# Patient Record
Sex: MALE | Race: WHITE
[De-identification: names, ages, dates, MRNs, and addresses within clinical notes are randomized per-mention and may not be internally consistent; named-entity substitution may affect disease eponyms.]

---

## 2020-02-25 ENCOUNTER — APPOINTMENT (OUTPATIENT)
Dept: FAMILY MEDICINE | Facility: CLINIC | Age: 30
End: 2020-02-25
Payer: COMMERCIAL

## 2020-02-25 VITALS
SYSTOLIC BLOOD PRESSURE: 122 MMHG | RESPIRATION RATE: 13 BRPM | OXYGEN SATURATION: 98 % | HEIGHT: 70 IN | DIASTOLIC BLOOD PRESSURE: 74 MMHG | WEIGHT: 271 LBS | BODY MASS INDEX: 38.8 KG/M2 | HEART RATE: 77 BPM

## 2020-02-25 DIAGNOSIS — Z29.9 ENCOUNTER FOR PROPHYLACTIC MEASURES, UNSPECIFIED: ICD-10-CM

## 2020-02-25 DIAGNOSIS — Z20.6 CONTACT WITH AND (SUSPECTED) EXPOSURE TO HUMAN IMMUNODEFICIENCY VIRUS [HIV]: ICD-10-CM

## 2020-02-25 DIAGNOSIS — Z00.00 ENCOUNTER FOR GENERAL ADULT MEDICAL EXAMINATION W/OUT ABNORMAL FINDINGS: ICD-10-CM

## 2020-02-25 DIAGNOSIS — Z77.21 CONTACT WITH AND (SUSPECTED) EXPOSURE TO POTENTIALLY HAZARDOUS BODY FLUIDS: ICD-10-CM

## 2020-02-25 DIAGNOSIS — Z02.89 ENCOUNTER FOR OTHER ADMINISTRATIVE EXAMINATIONS: ICD-10-CM

## 2020-02-25 DIAGNOSIS — Z87.09 PERSONAL HISTORY OF OTHER DISEASES OF THE RESPIRATORY SYSTEM: ICD-10-CM

## 2020-02-25 PROCEDURE — 00018S: CUSTOM

## 2020-02-25 PROCEDURE — 00024S: CUSTOM

## 2020-02-25 PROCEDURE — 00012S: CUSTOM

## 2020-02-25 PROCEDURE — 00001S: CUSTOM

## 2020-02-25 PROCEDURE — 00017S: CUSTOM

## 2020-02-25 NOTE — PHYSICAL EXAM
[No Acute Distress] : no acute distress [Well Nourished] : well nourished [Well Developed] : well developed [Well-Appearing] : well-appearing [Normal Sclera/Conjunctiva] : normal sclera/conjunctiva [PERRL] : pupils equal round and reactive to light [Normal Outer Ear/Nose] : the outer ears and nose were normal in appearance [EOMI] : extraocular movements intact [Normal Oropharynx] : the oropharynx was normal [No JVD] : no jugular venous distention [No Lymphadenopathy] : no lymphadenopathy [Supple] : supple [No Respiratory Distress] : no respiratory distress  [Thyroid Normal, No Nodules] : the thyroid was normal and there were no nodules present [Clear to Auscultation] : lungs were clear to auscultation bilaterally [No Accessory Muscle Use] : no accessory muscle use [Normal Rate] : normal rate  [Normal S1, S2] : normal S1 and S2 [Regular Rhythm] : with a regular rhythm [No Murmur] : no murmur heard [No Carotid Bruits] : no carotid bruits [No Varicosities] : no varicosities [No Abdominal Bruit] : a ~M bruit was not heard ~T in the abdomen [Pedal Pulses Present] : the pedal pulses are present [No Edema] : there was no peripheral edema [No Palpable Aorta] : no palpable aorta [Soft] : abdomen soft [No Extremity Clubbing/Cyanosis] : no extremity clubbing/cyanosis [Non Tender] : non-tender [Non-distended] : non-distended [No HSM] : no HSM [No Masses] : no abdominal mass palpated [Normal Bowel Sounds] : normal bowel sounds [Normal Posterior Cervical Nodes] : no posterior cervical lymphadenopathy [Normal Anterior Cervical Nodes] : no anterior cervical lymphadenopathy [No Spinal Tenderness] : no spinal tenderness [No CVA Tenderness] : no CVA  tenderness [No Joint Swelling] : no joint swelling [Grossly Normal Strength/Tone] : grossly normal strength/tone [No Rash] : no rash [No Focal Deficits] : no focal deficits [Coordination Grossly Intact] : coordination grossly intact [Normal Gait] : normal gait [Deep Tendon Reflexes (DTR)] : deep tendon reflexes were 2+ and symmetric [Normal Affect] : the affect was normal [Normal Insight/Judgement] : insight and judgment were intact

## 2020-02-27 PROBLEM — Z02.89 PHYSICAL EXAMINATION OF EMPLOYEE: Status: ACTIVE | Noted: 2020-02-27

## 2020-02-27 NOTE — HISTORY OF PRESENT ILLNESS
[FreeTextEntry1] : CPE [facility] [de-identified] : CPE [facility]

## 2020-02-27 NOTE — PLAN
[FreeTextEntry1] : CPE [facility]. Patient has no complaints. \par \par Patient is cleared without any limitations.

## 2020-03-30 ENCOUNTER — TRANSCRIPTION ENCOUNTER (OUTPATIENT)
Age: 30
End: 2020-03-30

## 2020-04-03 ENCOUNTER — EMERGENCY (EMERGENCY)
Facility: HOSPITAL | Age: 30
LOS: 0 days | Discharge: HOME | End: 2020-04-03
Attending: EMERGENCY MEDICINE
Payer: COMMERCIAL

## 2020-04-03 VITALS
SYSTOLIC BLOOD PRESSURE: 145 MMHG | HEART RATE: 82 BPM | DIASTOLIC BLOOD PRESSURE: 76 MMHG | OXYGEN SATURATION: 100 % | TEMPERATURE: 100 F | RESPIRATION RATE: 20 BRPM

## 2020-04-03 VITALS
HEART RATE: 87 BPM | HEIGHT: 70 IN | WEIGHT: 268.08 LBS | DIASTOLIC BLOOD PRESSURE: 87 MMHG | SYSTOLIC BLOOD PRESSURE: 152 MMHG | TEMPERATURE: 99 F | RESPIRATION RATE: 19 BRPM | OXYGEN SATURATION: 100 %

## 2020-04-03 DIAGNOSIS — Z82.49 FAMILY HISTORY OF ISCHEMIC HEART DISEASE AND OTHER DISEASES OF THE CIRCULATORY SYSTEM: ICD-10-CM

## 2020-04-03 DIAGNOSIS — J30.2 OTHER SEASONAL ALLERGIC RHINITIS: ICD-10-CM

## 2020-04-03 DIAGNOSIS — R06.02 SHORTNESS OF BREATH: ICD-10-CM

## 2020-04-03 DIAGNOSIS — J40 BRONCHITIS, NOT SPECIFIED AS ACUTE OR CHRONIC: ICD-10-CM

## 2020-04-03 DIAGNOSIS — K58.9 IRRITABLE BOWEL SYNDROME WITHOUT DIARRHEA: ICD-10-CM

## 2020-04-03 DIAGNOSIS — U07.1 COVID-19: ICD-10-CM

## 2020-04-03 DIAGNOSIS — M79.10 MYALGIA, UNSPECIFIED SITE: ICD-10-CM

## 2020-04-03 DIAGNOSIS — G47.33 OBSTRUCTIVE SLEEP APNEA (ADULT) (PEDIATRIC): ICD-10-CM

## 2020-04-03 DIAGNOSIS — Z98.89 OTHER SPECIFIED POSTPROCEDURAL STATES: Chronic | ICD-10-CM

## 2020-04-03 PROCEDURE — 99284 EMERGENCY DEPT VISIT MOD MDM: CPT

## 2020-04-03 PROCEDURE — 99283 EMERGENCY DEPT VISIT LOW MDM: CPT

## 2020-04-03 PROCEDURE — 71045 X-RAY EXAM CHEST 1 VIEW: CPT

## 2020-04-03 PROCEDURE — 94640 AIRWAY INHALATION TREATMENT: CPT

## 2020-04-03 PROCEDURE — 71045 X-RAY EXAM CHEST 1 VIEW: CPT | Mod: 26

## 2020-04-03 RX ORDER — AZITHROMYCIN 500 MG/1
1 TABLET, FILM COATED ORAL
Qty: 1 | Refills: 0
Start: 2020-04-03

## 2020-04-03 RX ORDER — AZITHROMYCIN 500 MG/1
500 TABLET, FILM COATED ORAL ONCE
Refills: 0 | Status: COMPLETED | OUTPATIENT
Start: 2020-04-03 | End: 2020-04-03

## 2020-04-03 RX ORDER — ALBUTEROL 90 UG/1
2 AEROSOL, METERED ORAL ONCE
Refills: 0 | Status: COMPLETED | OUTPATIENT
Start: 2020-04-03 | End: 2020-04-03

## 2020-04-03 RX ORDER — ALBUTEROL 90 UG/1
2 AEROSOL, METERED ORAL
Qty: 1 | Refills: 0
Start: 2020-04-03

## 2020-04-03 RX ORDER — ACETAMINOPHEN 500 MG
1000 TABLET ORAL ONCE
Refills: 0 | Status: COMPLETED | OUTPATIENT
Start: 2020-04-03 | End: 2020-04-03

## 2020-04-03 RX ADMIN — Medication 1200 MILLIGRAM(S): at 03:51

## 2020-04-03 RX ADMIN — ALBUTEROL 2 PUFF(S): 90 AEROSOL, METERED ORAL at 03:51

## 2020-04-03 RX ADMIN — AZITHROMYCIN 500 MILLIGRAM(S): 500 TABLET, FILM COATED ORAL at 04:53

## 2020-04-03 RX ADMIN — Medication 1000 MILLIGRAM(S): at 03:51

## 2020-04-03 NOTE — ED ADULT NURSE NOTE - OBJECTIVE STATEMENT
Patient presents to the ED with worsening shortness of breath, cough, subjective fever. Pt states he started not feeling well Monday of this week, started feeling worse on Tuesday and was subsequently swabbed at [facility], on Thursday his swab came back positive for covid 19. Pt now more short of breath, states that the shortness of breath is worse at night. Pt is able to speak in complete sentences but is winded when doing so. Pt skin color consistent with ethnicity, no pallor or diaphoresis noted. Pt oxygen saturation is 100 on room air. Pt shortness of breath worse with conversation, deep breathing, and exertion. Pt states he feels like his chest is empty. PT denies chest pain, abdominal pain at this time. Pt with hx of seasonal bronchitis, hx of gastric surgery in 2018. 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

## 2020-04-03 NOTE — ED PROVIDER NOTE - PATIENT PORTAL LINK FT
You can access the FollowMyHealth Patient Portal offered by [facility] by registering at the following website: [email or web address]. By joining [facility] FollowMyHealth portal, you will also be able to view your health information using other applications (apps) compatible with our system.

## 2020-04-03 NOTE — ED PROVIDER NOTE - CONSTITUTIONAL, MLM
normal... Well appearing, awake, alert, oriented to person, place, time/situation and in no apparent distress.  Speaking full sentences.

## 2020-04-03 NOTE — ED PROVIDER NOTE - FAMILY HISTORY
Family history of hyperthyroidism     Father  Still living? Yes, Estimated age: Age Unknown  Family history of coronary artery disease, Age at diagnosis: Age Unknown  Family history of diabetes mellitus, Age at diagnosis: Age Unknown     Mother  Still living? Yes, Estimated age: Age Unknown  Family history of hypertension, Age at diagnosis: Age Unknown

## 2020-04-03 NOTE — ED ADULT TRIAGE NOTE - CHIEF COMPLAINT QUOTE
pt biba for cough, fever, chills, SOB. tested positive for COVID 1 hr PTA. hx depression and seasonal bronchitis.

## 2020-04-03 NOTE — ED PROVIDER NOTE - OBJECTIVE STATEMENT
Pt. is a 28 yo M with a hx of mild reactive airway disease, obesity, ZOHAIB, IBS, presenting with shortness of breath.  Patient states he began to have symptoms of cough, body aches, and fever about 5 days ago.  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  He did not take any meds prior to arrival. He took medical marijuana for joint pain which helped. States he awoke with trouble speaking and breathing.

## 2020-04-03 NOTE — ED PROVIDER NOTE - PROGRESS NOTE DETAILS
Patient feels much better and now wants to go home.  CXR results discussed.  Azithromycin, mucinex, and albuterol sent to pharmacy.  Strict return precautions given.

## 2020-04-03 NOTE — ED PROVIDER NOTE - NSFOLLOWUPINSTRUCTIONS_ED_ALL_ED_FT
Take tylenol 650mg every 4-6 hours as needed for body aches or fever.    Keep well hydrated.    Take mucinex 1200mg every 12 hours for 5 days. (over the counter)  Use albuterol inhaler 2 puffs every 4 hours as needed for cough or shortness of breath.            COVID-19 (CORONAVIRUS DISEASE 2019) - AfterCare(R) Instructions(ER/ED)     COVID-19 (Coronavirus Disease 2019)    WHAT YOU NEED TO KNOW:    COVID-19 is the disease caused by the 2019 novel (new) coronavirus. It was first found in individuals in a part of [place] in late 2019. The virus is spreading to other countries as infected persons travel. Coronaviruses generally cause respiratory (nose, throat, and lung) infections, such as a cold. They can also cause serious infections such as Middle East respiratory syndrome (MERS) and severe acute respiratory syndrome (SARS). The new virus is related to the SARS coronavirus, so it is officially named SARS coronavirus 2 (SARS-CoV-2).    DISCHARGE INSTRUCTIONS:    If you think you or someone you know may be infected: It is important for anyone who may be infected to get tested right away. Do the following to protect others:     If emergency care is needed, tell the  about the possible infection, or call ahead and tell the emergency department.      Call a healthcare provider to be seen in the office. Anyone who may be infected should not arrive without calling first. The provider will need to protect staff members and other patients.      The person who may be infected needs to wear a medical mask before getting medical care. The mask needs to stay on until the provider says to remove it.    Call your local emergency number (911 in the [place]) or go to the emergency department if: You have signs or symptoms of COVID-19, and any of the following is true:     You traveled within the last 14 days to an area where the virus is active or had close contact with someone who did.      You had close contact within the last 14 days with someone who has a confirmed infection.    Call your doctor if: You do not have signs or symptoms of COVID-19, but any of the following is true:     You traveled within the last 14 days to an area where the virus is active or had close contact with someone who did.      You had close contact within the last 14 days with someone who has a confirmed infection.      You have questions or concerns about your condition or care.    Medicines: You may need any of the following for mild symptoms:     Decongestants help reduce nasal congestion and help you breathe more easily. If you take decongestant pills, they may make you feel restless or cause problems with your sleep. Do not use decongestant sprays for more than a few days.      Cough suppressants help reduce coughing. Ask your healthcare provider which type of cough medicine is best for you.      Acetaminophen decreases pain and fever. It is available without a doctor's order. Ask how much to take and how often to take it. Follow directions. Read the labels of all other medicines you are using to see if they also contain acetaminophen, or ask your doctor or pharmacist. Acetaminophen can cause liver damage if not taken correctly. Do not use more than 4 grams (4,000 milligrams) total of acetaminophen in one day.       NSAIDs, such as ibuprofen, help decrease swelling, pain, and fever. NSAIDs can cause stomach bleeding or kidney problems in certain people. If you take blood thinner medicine, always ask your healthcare provider if NSAIDs are safe for you. Always read the medicine label and follow directions.      Take your medicine as directed. Contact your healthcare provider if you think your medicine is not helping or if you have side effects. Tell him or her if you are allergic to any medicine. Keep a list of the medicines, vitamins, and herbs you take. Include the amounts, and when and why you take them. Bring the list or the pill bottles to follow-up visits. Carry your medicine list with you in case of an emergency.    How the 2019 coronavirus spreads: The virus appears to spread quickly and easily. The following are ways the virus is thought to spread, but more information may be coming:     Droplets are the most common way all coronaviruses spread. The virus can travel in droplets that form when a person talks, coughs, or sneezes. Anyone who breathes in the virus or gets it in his or her eyes can become infected.      An infected person may be able to leave the virus on objects and surfaces. Another person can get the virus on his or her hands by touching the object or surface. Infection happens if the person then touches his or her eyes or mouth with unwashed hands. It is not yet known how long the virus can stay on an object or surface. Evidence shows it may be as long as 9 days at room temperature.      Person-to-person contact may spread the virus. For example, an infected person can spread the virus by shaking hands with someone. At this time, it does not appear that the virus can be passed to a baby during pregnancy or delivery. The virus also does not appear to spread during breastfeeding. If you are pregnant or breastfeeding, talk to your healthcare provider or obstetrician about any concerns you have.      An infected animal may be able to infect a person who touches it. This may happen at live markets or on a farm.    Prevent a 2019 coronavirus infection: Everyone should do the following to prevent getting or spreading the virus:     Wash your hands often. Use soap and water every time you wash your hands. Rub your soapy hands together, lacing your fingers. Use the fingers of one hand to scrub under the nails of the other hand. Wash for at least 20 seconds. Rinse with warm, running water for several seconds. Then dry your hands. Use germ-killing gel if soap and water are not available. Do not touch your eyes or mouth without washing your hands first. Handwashing           Cover a sneeze or cough. Use a tissue that covers your mouth and nose. Throw the tissue away in a trash can right away. Use the bend of your arm if a tissue is not available. Wash your hands well with soap and water or use a hand . Do not stand close to anyone who is sneezing or coughing.      Be careful around others. The best way to prevent infection is to avoid anyone who is infected, but this may be difficult. An infected person may be able to spread the virus before signs or symptoms begin. Until more is known, you may not want to shake hands with others. If you do shake hands, wash your hands or use hand  as soon as possible. You do not need to wear a medical mask if you are well and not caring for an infected person.      Ask about vaccines you may need. No vaccine is available for the new coronavirus. But any infection can affect your immune system. A weakened immune system makes you more vulnerable to the new coronavirus. Until a vaccine against the new virus is developed, do the following:   Get a flu vaccine as soon as recommended each year. The flu vaccine is available starting in September or October. Flu viruses change, so it is important to get a flu vaccine every year.      Talk to your healthcare provider about your vaccine history. Tell him or her if you did not get certain vaccines as a child, or you did not get all recommended doses. Tell him or her if you do not know your vaccine history. He or she will tell you which vaccines you need, and when to get them.           If you have COVID-19: Healthcare providers will give you specific instructions to follow. The following are general guidelines to remind you of how to keep others safe until you are well:     Limit close contact with others. Your healthcare provider will tell you when it is okay to be around others. This may be when you do not have a fever, do not take fever medicine, and have no symptoms. Fluid from your respiratory tract will need to test negative for the virus 2 times at least 24 hours apart. Until then, do the following along with any instructions from your provider:   Only go out of the house for medical appointments. Always call the provider’s office first so he or she can prepare to keep others safe.      Stay at least 6 feet (2 meters) away from others.      Sleep in a different room from others in the house.      Do not shake hands with other people.      Wear a medical mask when others are near you. This can help prevent droplets from spreading the virus when you talk, sneeze, or cough.      Do not share items. Do not share dishes, towels, or other items with anyone. Items need to be washed after you use them.      Do not handle live animals. The virus may be spread to animals, including pets. Until more is known, it is best not to touch, play with, or handle live animals.    Self-care:     Drink more liquids as directed. Liquids will help thin and loosen mucus so you can cough it up. Liquids will also help prevent dehydration. Liquids that help prevent dehydration include water, fruit juice, and broth. Do not drink liquids that contain caffeine. Caffeine can increase your risk for dehydration. Ask your healthcare provider how much liquid to drink each day.      Soothe a sore throat. Gargle with warm salt water. This may help your sore throat feel better. Make salt water by dissolving ¼ teaspoon salt in 1 cup warm water. You may also suck on hard candy or throat lozenges. You may use a sore throat spray.      Use a humidifier or vaporizer. Use a cool mist humidifier or a vaporizer to increase air moisture in your home. This may make it easier for you to breathe and help decrease your cough.      Use saline nasal drops as directed. These help relieve congestion.      Apply petroleum-based jelly around the outside of your nostrils. This can decrease irritation from blowing your nose.      Do not smoke. Nicotine and other chemicals in cigarettes and cigars can make your symptoms worse. They can also cause infections such as bronchitis or pneumonia. Ask your healthcare provider for information if you currently smoke and need help to quit. E-cigarettes or smokeless tobacco still contain nicotine. Talk to your healthcare provider before you use these products.    If you take care of someone who has COVID-19:     Wear a medical mask when you are near the person. This can help protect you from droplets that carry the virus when the person talks, sneezes, or coughs.      Do not allow others to go near the person. No one should come to the person's home unless it is necessary. Keep the room's door shut unless you need to go in or out.      Make sure the person's room has good air flow. You may be able to open the window if the weather allows. An air conditioner can also be turned on to help air move.      Clean items the person uses or touches. Wear disposable gloves to handle the person's laundry. Place the laundry in a plastic bag. Use hot water and soap to wash the person's laundry. Wash eating utensils and other items after the person uses them. Throw your gloves away after you use them.      Clean surfaces often. Use bleach diluted with water or disinfecting wipes. Clean counters, doorknobs, toilet seats, and other surfaces.    Follow up with your doctor as directed: Write down your questions so you remember to ask them during your visits.    For more information:     Centers for Disease Control and Prevention  [address] [place] [address]  Phone: 6-376-6044323  Phone: 1-267-3145734  Web Address: http://www.cdc.gov           © Copyright [employer] 2020       back to top                      © Copyright [employer] 2020

## 2020-04-03 NOTE — ED PROVIDER NOTE - CLINICAL SUMMARY MEDICAL DECISION MAKING FREE TEXT BOX
Covid 19 with SOB and 100% O2 on Room Air.  Will treat with tylenol, mucinex, and albuterol and get CXR.

## 2020-04-25 ENCOUNTER — MESSAGE (OUTPATIENT)
Age: 30
End: 2020-04-25

## 2020-04-26 ENCOUNTER — TRANSCRIPTION ENCOUNTER (OUTPATIENT)
Age: 30
End: 2020-04-26

## 2020-09-04 ENCOUNTER — TRANSCRIPTION ENCOUNTER (OUTPATIENT)
Age: 30
End: 2020-09-04

## 2020-11-05 ENCOUNTER — TRANSCRIPTION ENCOUNTER (OUTPATIENT)
Age: 30
End: 2020-11-05

## 2020-11-07 ENCOUNTER — TRANSCRIPTION ENCOUNTER (OUTPATIENT)
Age: 30
End: 2020-11-07

## 2020-12-08 ENCOUNTER — APPOINTMENT (OUTPATIENT)
Dept: NEUROLOGY | Facility: CLINIC | Age: 30
End: 2020-12-08
Payer: COMMERCIAL

## 2020-12-08 VITALS
DIASTOLIC BLOOD PRESSURE: 84 MMHG | TEMPERATURE: 97.5 F | SYSTOLIC BLOOD PRESSURE: 136 MMHG | WEIGHT: 270 LBS | HEIGHT: 69 IN | BODY MASS INDEX: 39.99 KG/M2 | HEART RATE: 71 BPM

## 2020-12-08 PROCEDURE — 99204 OFFICE O/P NEW MOD 45 MIN: CPT

## 2020-12-08 PROCEDURE — 99072 ADDL SUPL MATRL&STAF TM PHE: CPT

## 2020-12-08 NOTE — DISCUSSION/SUMMARY
[FreeTextEntry1] : Mr. Ivey is a 30 year old man with a history of ADHD as a child.\par He is again experiencing difficulties with attention and focus.\par \par He has a non-focal neurological examination.\par He likely has residual ADHD.\par His adult ADHD self report scale is suggestive of ADHD.\par I am ordering an EEG to r/o any underlying epileptiform activity or contraindications to stimulant medications.\par Recent home sleep study was reportedly negative for ZOHAIB.\par \par We discussed different medication options.\par I think he will benefit from a long acting stimulant since most of his work is a 12 hour shift.\par 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 He typically does not have difficulties with attention at this job.\par Will prescribe methylphenidate LA 20 mg/day.\par If he has issues with absorption due to bariatric surgery and it is not long acting, we can change to short acting.\par We discussed potential side effects of stimulants including tachycardia, palpitations, anxiety, insomnia and appetite suppression.\par \par He should continue to use other strategies to help with ADHD including exercise, good sleep, healthy diet, use of a planner/organizer.\par \par f/u 4-6 weeks, sooner if needed.\par

## 2020-12-08 NOTE — PHYSICAL EXAM
[FreeTextEntry1] : Examination:\par Constitutional: normal, no apparent distress\par Eyes: normal conjunctiva b/l, no ptosis, visual fields full\par Respiratory: no respiratory distress, normal effort, normal auscultation\par Cardiovascular: normal rate, rhythm, no murmurs\par Neck: supple, no masses\par Vascular: carotids normal\par Skin: normal color, no rashes\par Psych: normal mood, affect\par \par Neurological:\par Memory: normal memory, oriented to person, place, time\par Language intact/no aphasia\par Cranial Nerves: II-XII intact, Pupils equally round and reactive to light, ocular muscles/movements intact, no ptosis, no facial weakness, tongue protrudes normally in the midline, \par Motor: normal tone, no pronator drift, full strength in all four extremities in the proximal and distal muscle groups\par Coordination: Fine motor movements intact, rapid alternating movements intact, finger to nose intact bilaterally\par Sensory: intact to light touch, vibration, joint position sense, negative Romberg examination\par DTRs: symmetric, 2+ in b/l triceps, 2+ in b/l biceps, 2+ in b/l brachioradialis, 2+ in bilateral patellars, 2+ in bilateral Achilles, Babinskis negative bilaterally\par Gait: narrow based, steady\par \par

## 2020-12-08 NOTE — HISTORY OF PRESENT ILLNESS
[FreeTextEntry1] : Mr. Ivey is here today for neurology evaluation.\par He reports having difficulty focusing.\par His therapist suggested that he be evaluated for ADHD.\par He was previously diagnosed at age 8-9 ADHD. He was unfocused, had difficulty sitting still and would wander around his class room. he was hyperactive.\par He was treated with stimulant medications (including Ritalin, Concerta and some others) and was on medication until his mid-teens.\par He believed that he stopped medication in 10th grade but is not sure why. He did graduate with average grades. He went to [employer] but did not complete the program.\par \par He has been working with EMS services for 7 years. 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 He states that he feels "jumbled in his brain."\par He feels that his symptoms became more noticeable after he stopped smoking marijuana.\par he has tried different behavioral exercises to help with focus with limited effect. 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 He was previously on Lexapro but switched to Prozac about 2 months ago and feels better.\par \par He takes medical marijuana for knee pain and sleep.

## 2020-12-08 NOTE — CONSULT LETTER
[Dear  ___] : Dear  [unfilled], [Consult Letter:] : I had the pleasure of evaluating your patient, [unfilled]. [Please see my note below.] : Please see my note below. [Consult Closing:] : Thank you very much for allowing me to participate in the care of this patient.  If you have any questions, please do not hesitate to contact me. [FreeTextEntry2] : Giovanny Block [FreeTextEntry3] : Sincerely,\par \par \par Jazlyn Campbell MD\par Diplomate, American Academy of Psychiatry and Neurology\par Board Certified in the Subspecialty of Clinical Neurophysiology\par Board Certified in the Subspecialty of Sleep Medicine\par Board Certified in the Subspecialty of Epilepsy\par

## 2020-12-08 NOTE — REVIEW OF SYSTEMS
[Anxiety] : anxiety [Depression] : depression [Negative] : Musculoskeletal [de-identified] : stressed out

## 2020-12-09 RX ORDER — METHYLPHENIDATE HYDROCHLORIDE 20 MG/1
20 CAPSULE, EXTENDED RELEASE ORAL
Qty: 30 | Refills: 0 | Status: ACTIVE | COMMUNITY
Start: 2020-12-08 | End: 1900-01-01

## 2020-12-14 ENCOUNTER — TRANSCRIPTION ENCOUNTER (OUTPATIENT)
Age: 30
End: 2020-12-14

## 2020-12-23 ENCOUNTER — EMERGENCY (EMERGENCY)
Facility: HOSPITAL | Age: 30
LOS: 1 days | Discharge: HOME | End: 2020-12-23
Attending: STUDENT IN AN ORGANIZED HEALTH CARE EDUCATION/TRAINING PROGRAM
Payer: COMMERCIAL

## 2020-12-23 VITALS
DIASTOLIC BLOOD PRESSURE: 70 MMHG | RESPIRATION RATE: 15 BRPM | OXYGEN SATURATION: 100 % | HEART RATE: 65 BPM | SYSTOLIC BLOOD PRESSURE: 113 MMHG

## 2020-12-23 VITALS
RESPIRATION RATE: 18 BRPM | HEART RATE: 68 BPM | WEIGHT: 270.07 LBS | SYSTOLIC BLOOD PRESSURE: 169 MMHG | DIASTOLIC BLOOD PRESSURE: 91 MMHG | TEMPERATURE: 99 F | OXYGEN SATURATION: 100 % | HEIGHT: 70 IN

## 2020-12-23 DIAGNOSIS — Z98.89 OTHER SPECIFIED POSTPROCEDURAL STATES: Chronic | ICD-10-CM

## 2020-12-23 LAB
ALBUMIN SERPL ELPH-MCNC: 4.5 G/DL — SIGNIFICANT CHANGE UP (ref 3.3–5)
ALP SERPL-CCNC: 57 U/L — SIGNIFICANT CHANGE UP (ref 40–120)
ALT FLD-CCNC: 23 U/L — SIGNIFICANT CHANGE UP (ref 10–45)
ANION GAP SERPL CALC-SCNC: 11 MMOL/L — SIGNIFICANT CHANGE UP (ref 5–17)
AST SERPL-CCNC: 21 U/L — SIGNIFICANT CHANGE UP (ref 10–40)
BASOPHILS # BLD AUTO: 0.05 K/UL — SIGNIFICANT CHANGE UP (ref 0–0.2)
BASOPHILS NFR BLD AUTO: 0.5 % — SIGNIFICANT CHANGE UP (ref 0–2)
BILIRUB SERPL-MCNC: 1 MG/DL — SIGNIFICANT CHANGE UP (ref 0.2–1.2)
BUN SERPL-MCNC: 13 MG/DL — SIGNIFICANT CHANGE UP (ref 7–23)
CALCIUM SERPL-MCNC: 10.7 MG/DL — HIGH (ref 8.4–10.5)
CHLORIDE SERPL-SCNC: 103 MMOL/L — SIGNIFICANT CHANGE UP (ref 96–108)
CO2 SERPL-SCNC: 24 MMOL/L — SIGNIFICANT CHANGE UP (ref 22–31)
CREAT SERPL-MCNC: 1.07 MG/DL — SIGNIFICANT CHANGE UP (ref 0.5–1.3)
EOSINOPHIL # BLD AUTO: 0.22 K/UL — SIGNIFICANT CHANGE UP (ref 0–0.5)
EOSINOPHIL NFR BLD AUTO: 2.4 % — SIGNIFICANT CHANGE UP (ref 0–6)
GLUCOSE SERPL-MCNC: 84 MG/DL — SIGNIFICANT CHANGE UP (ref 70–99)
HCT VFR BLD CALC: 42.3 % — SIGNIFICANT CHANGE UP (ref 39–50)
HGB BLD-MCNC: 15.1 G/DL — SIGNIFICANT CHANGE UP (ref 13–17)
IMM GRANULOCYTES NFR BLD AUTO: 0.2 % — SIGNIFICANT CHANGE UP (ref 0–1.5)
LIDOCAIN IGE QN: 47 U/L — SIGNIFICANT CHANGE UP (ref 7–60)
LYMPHOCYTES # BLD AUTO: 4.02 K/UL — HIGH (ref 1–3.3)
LYMPHOCYTES # BLD AUTO: 43.8 % — SIGNIFICANT CHANGE UP (ref 13–44)
MCHC RBC-ENTMCNC: 29.7 PG — SIGNIFICANT CHANGE UP (ref 27–34)
MCHC RBC-ENTMCNC: 35.7 GM/DL — SIGNIFICANT CHANGE UP (ref 32–36)
MCV RBC AUTO: 83.1 FL — SIGNIFICANT CHANGE UP (ref 80–100)
MONOCYTES # BLD AUTO: 0.63 K/UL — SIGNIFICANT CHANGE UP (ref 0–0.9)
MONOCYTES NFR BLD AUTO: 6.9 % — SIGNIFICANT CHANGE UP (ref 2–14)
NEUTROPHILS # BLD AUTO: 4.23 K/UL — SIGNIFICANT CHANGE UP (ref 1.8–7.4)
NEUTROPHILS NFR BLD AUTO: 46.2 % — SIGNIFICANT CHANGE UP (ref 43–77)
NRBC # BLD: 0 /100 WBCS — SIGNIFICANT CHANGE UP (ref 0–0)
PLATELET # BLD AUTO: 224 K/UL — SIGNIFICANT CHANGE UP (ref 150–400)
POTASSIUM SERPL-MCNC: 3.7 MMOL/L — SIGNIFICANT CHANGE UP (ref 3.5–5.3)
POTASSIUM SERPL-SCNC: 3.7 MMOL/L — SIGNIFICANT CHANGE UP (ref 3.5–5.3)
PROT SERPL-MCNC: 7.1 G/DL — SIGNIFICANT CHANGE UP (ref 6–8.3)
RBC # BLD: 5.09 M/UL — SIGNIFICANT CHANGE UP (ref 4.2–5.8)
RBC # FLD: 12.4 % — SIGNIFICANT CHANGE UP (ref 10.3–14.5)
SODIUM SERPL-SCNC: 138 MMOL/L — SIGNIFICANT CHANGE UP (ref 135–145)
WBC # BLD: 9.17 K/UL — SIGNIFICANT CHANGE UP (ref 3.8–10.5)
WBC # FLD AUTO: 9.17 K/UL — SIGNIFICANT CHANGE UP (ref 3.8–10.5)

## 2020-12-23 PROCEDURE — 99283 EMERGENCY DEPT VISIT LOW MDM: CPT | Mod: 25

## 2020-12-23 PROCEDURE — 80053 COMPREHEN METABOLIC PANEL: CPT

## 2020-12-23 PROCEDURE — 71046 X-RAY EXAM CHEST 2 VIEWS: CPT

## 2020-12-23 PROCEDURE — 71046 X-RAY EXAM CHEST 2 VIEWS: CPT | Mod: 26

## 2020-12-23 PROCEDURE — 99285 EMERGENCY DEPT VISIT HI MDM: CPT

## 2020-12-23 PROCEDURE — 93005 ELECTROCARDIOGRAM TRACING: CPT

## 2020-12-23 PROCEDURE — 93010 ELECTROCARDIOGRAM REPORT: CPT

## 2020-12-23 PROCEDURE — 85025 COMPLETE CBC W/AUTO DIFF WBC: CPT

## 2020-12-23 PROCEDURE — 83690 ASSAY OF LIPASE: CPT

## 2020-12-23 RX ORDER — FAMOTIDINE 10 MG/ML
1 INJECTION INTRAVENOUS
Qty: 60 | Refills: 0
Start: 2020-12-23 | End: 2021-01-21

## 2020-12-23 RX ORDER — LIDOCAINE 4 G/100G
10 CREAM TOPICAL ONCE
Refills: 0 | Status: COMPLETED | OUTPATIENT
Start: 2020-12-23 | End: 2020-12-23

## 2020-12-23 RX ORDER — SUCRALFATE 1 G
1 TABLET ORAL ONCE
Refills: 0 | Status: COMPLETED | OUTPATIENT
Start: 2020-12-23 | End: 2020-12-23

## 2020-12-23 RX ORDER — OMEPRAZOLE 10 MG/1
1 CAPSULE, DELAYED RELEASE ORAL
Qty: 30 | Refills: 0
Start: 2020-12-23 | End: 2021-01-21

## 2020-12-23 RX ORDER — FAMOTIDINE 10 MG/ML
40 INJECTION INTRAVENOUS ONCE
Refills: 0 | Status: COMPLETED | OUTPATIENT
Start: 2020-12-23 | End: 2020-12-23

## 2020-12-23 RX ADMIN — LIDOCAINE 10 MILLILITER(S): 4 CREAM TOPICAL at 23:06

## 2020-12-23 RX ADMIN — Medication 30 MILLILITER(S): at 22:49

## 2020-12-23 RX ADMIN — Medication 30 MILLILITER(S): at 21:10

## 2020-12-23 RX ADMIN — Medication 1 GRAM(S): at 22:24

## 2020-12-23 RX ADMIN — FAMOTIDINE 40 MILLIGRAM(S): 10 INJECTION INTRAVENOUS at 21:11

## 2020-12-23 NOTE — ED PROVIDER NOTE - PATIENT PORTAL LINK FT
You can access the FollowMyHealth Patient Portal offered by [facility] by registering at the following website: [email or web address]. By joining [facility] FollowMyHealth portal, you will also be able to view your health information using other applications (apps) compatible with our system.

## 2020-12-23 NOTE — ED PROVIDER NOTE - OBJECTIVE STATEMENT
30 y.o. male 2 years s/p gastric sleeve by [facility] at [facility], ADD, depression coming in with burning sensation over his stomach.  Pt was having lunch and got symptoms typical for heartburn, took 2 Tums which helped by symptoms but immediately came back.  Has a history of GERD has been taking his Omeprazole over the past week daily.  The sensation in his chest feels burning, says he feels like he has a lighter in his sub sternal area.  No chest pain or SoB.  No cough, no fevers, no chills.  No belly pain nausea vomiting or diarrhea.  No melena.  No other history of surgery.  Nothing makes his symptoms worse, the tums will temporarily make it better.

## 2020-12-23 NOTE — ED PROVIDER NOTE - ATTENDING CONTRIBUTION TO CARE
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 Pt denies any sob, no vomiting, no fevers no chills. Pt is nontoxic appearing is aaox3 is ambulatory in the department, he has clear lungs no abdominal tenderness, no lower extremity edema, negative murphys sign , will have labs, ekg and likely dc home suspect symptomatic peptic ulcer disease. low suspicion for intraabdominal infection. no emesis.

## 2020-12-23 NOTE — ED ADULT NURSE NOTE - OBJECTIVE STATEMENT
y/o arrives to the ER c/o.... Pt is A&Ox4, speaking coherently. PMH ....Pt states. ....On assessment airway is patent, breathing spontaneusly and unlabored, skin is dry, warm and color appropiate to race. adomen is soft, non distended, non tender, ROM in all extremities. Pt denies SOB, chest pain, NVD, fevers, chills. Comfort measures provided. call bell within reach, bed locked in the lowest position. will continure to reasess . 29 y/o male arrives to the ER complaining of epigastric pain. PMH of gastric sleeve, depression. GERD.  Pt is A&Ox4, speaking coherently. Pt states he is being having heartburn  after the gastric sleeve 2 years ago;  taking omeprazole daily since the surgery. Today the pain started after eating, pain feels like a burning sensation over the sternum, pt took Tums, and Protonix, but the symptoms didn't improved. Pt placed on continuous pulse ox and cardiac monitor, NSR noted.  On assessment airway is patent, breathing spontaneously and unlabored, skin is dry, warm and color appropriate to race. abdomen is soft, non distended, non tender, full ROM in all extremities. Pt denies SOB, chest pain, N/V/D, fevers, chills. Comfort measures provided. call bell within reach, bed locked in the lowest position. will continue to reassess .

## 2020-12-23 NOTE — ED ADULT NURSE NOTE - CHPI ED NUR SYMPTOMS NEG
no back pain/no chest pain/no chills/no congestion/no dizziness/no fever/no nausea/no shortness of breath/no syncope

## 2020-12-23 NOTE — ED PROVIDER NOTE - NSFOLLOWUPINSTRUCTIONS_ED_ALL_ED_FT
1- Continue your medications as prescribed  2- Follow up with your doctor or our medicine clinic  3- If you have any new or worsening symptoms come back to the ER immediately 1- Continue your medications as prescribed  2- Follow up with your doctor or our medicine clinic  3- If you have any new or worsening symptoms come back to the ER immediately  4- Omeprazole 20 mg daily, add Pepcid 20 mg 2 times a day as needed for GERD

## 2020-12-28 ENCOUNTER — TRANSCRIPTION ENCOUNTER (OUTPATIENT)
Age: 30
End: 2020-12-28

## 2020-12-28 ENCOUNTER — APPOINTMENT (OUTPATIENT)
Dept: NEUROLOGY | Facility: CLINIC | Age: 30
End: 2020-12-28
Payer: COMMERCIAL

## 2020-12-28 PROCEDURE — 99072 ADDL SUPL MATRL&STAF TM PHE: CPT

## 2020-12-28 PROCEDURE — 95816 EEG AWAKE AND DROWSY: CPT

## 2021-01-01 ENCOUNTER — TRANSCRIPTION ENCOUNTER (OUTPATIENT)
Age: 31
End: 2021-01-01

## 2021-01-06 ENCOUNTER — TRANSCRIPTION ENCOUNTER (OUTPATIENT)
Age: 31
End: 2021-01-06

## 2021-01-07 ENCOUNTER — TRANSCRIPTION ENCOUNTER (OUTPATIENT)
Age: 31
End: 2021-01-07

## 2021-01-11 ENCOUNTER — APPOINTMENT (OUTPATIENT)
Dept: NEUROLOGY | Facility: CLINIC | Age: 31
End: 2021-01-11
Payer: COMMERCIAL

## 2021-01-11 PROCEDURE — 99213 OFFICE O/P EST LOW 20 MIN: CPT | Mod: 95

## 2021-01-11 NOTE — DISCUSSION/SUMMARY
[FreeTextEntry1] : Mr. Ivey is a 30 year old man with a history of ADHD as a child.\par He is again experiencing difficulties with attention and focus.\par \par He has a non-focal neurological examination.\par He likely has residual ADHD.\par His adult ADHD self report scale is suggestive of ADHD.\par EEG was normal.\par Recent home sleep study was reportedly negative for ZOHAIB.\par \par He had anxiety with methylphenidate so was changed to Vyvanse 30 mg.\par He has had good effect but still has several hours during work where he feels off.\par Will increase the dose of Vyvanse to 40 mg/day.\par If this does not improve the duration of action, can try to add a short acting stimulant to be used as needed.\par He should monitor HR and BP at work. \par \par \par He should continue to use other strategies to help with ADHD including exercise, good sleep, healthy diet, use of a planner/organizer.\par \par f/u 2-3, sooner if needed.\par

## 2021-01-11 NOTE — HISTORY OF PRESENT ILLNESS
[Home] : at home, [unfilled] , at the time of the visit. [Medical Office: ([facility])___] : at the medical office located in  [Verbal consent obtained from patient] : the patient, [unfilled] [FreeTextEntry1] : I last saw Mr. Ivey on 12/8/20.\par \par He started Vyvanse and does not feel anxiety that he felt with methylphenidate.\par He thinks that it helps but he does feel that it can be more effective. He feels more focused but only for a few hours.\par He finds that it does not last as long as it should.\par He denies having any side effects.\par \par He does not feel tired when the Vyvanse is effective. He does not crash but as the day goes on he feels more and more tired.\par \par He has checked his BP and it was normal.\par \par He had his second dose of covid vaccine on 1/9/20.\par

## 2021-02-16 ENCOUNTER — TRANSCRIPTION ENCOUNTER (OUTPATIENT)
Age: 31
End: 2021-02-16

## 2021-02-28 ENCOUNTER — TRANSCRIPTION ENCOUNTER (OUTPATIENT)
Age: 31
End: 2021-02-28

## 2021-03-01 ENCOUNTER — NON-APPOINTMENT (OUTPATIENT)
Age: 31
End: 2021-03-01

## 2021-03-01 ENCOUNTER — TRANSCRIPTION ENCOUNTER (OUTPATIENT)
Age: 31
End: 2021-03-01

## 2021-03-24 ENCOUNTER — APPOINTMENT (OUTPATIENT)
Dept: NEUROLOGY | Facility: CLINIC | Age: 31
End: 2021-03-24
Payer: COMMERCIAL

## 2021-03-24 DIAGNOSIS — F90.0 ATTENTION-DEFICIT HYPERACTIVITY DISORDER, PREDOMINANTLY INATTENTIVE TYPE: ICD-10-CM

## 2021-03-24 PROCEDURE — 99212 OFFICE O/P EST SF 10 MIN: CPT | Mod: 95

## 2021-03-24 NOTE — HISTORY OF PRESENT ILLNESS
[Home] : at home, [unfilled] , at the time of the visit. [Medical Office: ([facility])___] : at the medical office located in  [Verbal consent obtained from patient] : the patient, [unfilled] [FreeTextEntry1] : I last saw Mr. Ivey on 1/11/21.\par He says that everything is going well.\par He finds that the increased dose of Vyvanse is helpful. \par He takes 40 mg of Vyvanse on days when he has a longer work day.\par He finds that it gets him through about 8-9 hours of his day.\par He denies palpitations, anxiety or difficulty sleeping.\par \par He says that his heart rate and blood pressure have been normal.\par \par He needs future prescriptions to be sent through mail order.\par \par He received both covid vaccines.

## 2021-03-24 NOTE — DISCUSSION/SUMMARY
[FreeTextEntry1] : Mr. Ivey is a 30 year old man with a history of ADHD as a child.\par He is again experiencing difficulties with attention and focus.\par \par He has a non-focal neurological examination.\par He likely has residual ADHD.\par His adult ADHD self report scale is suggestive of ADHD.\par EEG was normal.\par Recent home sleep study was reportedly negative for ZOHAIB.\par \par He had anxiety with methylphenidate so was changed to Vyvanse 30 mg and subsequently 40 mg.\par He feels benefit with the increased dose and denies having any side effects.\par \par He should monitor HR and BP at work. \par \par He should continue to use other strategies to help with ADHD including exercise, good sleep, healthy diet, use of a planner/organizer.\par \par Advised to avoid taking medication on days when he is not working if possible.\par \par Will send next rx to [facility] mail order (last filled for 90 days on 2/26/21).\par \par f/u within 6 months, sooner if needed.\par \par

## 2021-05-06 ENCOUNTER — TRANSCRIPTION ENCOUNTER (OUTPATIENT)
Age: 31
End: 2021-05-06

## 2021-06-01 ENCOUNTER — TRANSCRIPTION ENCOUNTER (OUTPATIENT)
Age: 31
End: 2021-06-01

## 2021-06-03 ENCOUNTER — TRANSCRIPTION ENCOUNTER (OUTPATIENT)
Age: 31
End: 2021-06-03

## 2021-07-01 ENCOUNTER — OUTPATIENT (OUTPATIENT)
Dept: OUTPATIENT SERVICES | Facility: HOSPITAL | Age: 31
LOS: 1 days | End: 2021-07-01
Payer: COMMERCIAL

## 2021-07-01 VITALS
WEIGHT: 285.94 LBS | HEART RATE: 89 BPM | RESPIRATION RATE: 16 BRPM | OXYGEN SATURATION: 99 % | DIASTOLIC BLOOD PRESSURE: 84 MMHG | HEIGHT: 70 IN | SYSTOLIC BLOOD PRESSURE: 146 MMHG | TEMPERATURE: 98 F

## 2021-07-01 DIAGNOSIS — M51.36 OTHER INTERVERTEBRAL DISC DEGENERATION, LUMBAR REGION: ICD-10-CM

## 2021-07-01 DIAGNOSIS — Z98.84 BARIATRIC SURGERY STATUS: Chronic | ICD-10-CM

## 2021-07-01 DIAGNOSIS — M51.27 OTHER INTERVERTEBRAL DISC DISPLACEMENT, LUMBOSACRAL REGION: ICD-10-CM

## 2021-07-01 DIAGNOSIS — Z98.89 OTHER SPECIFIED POSTPROCEDURAL STATES: Chronic | ICD-10-CM

## 2021-07-01 LAB
ALBUMIN SERPL ELPH-MCNC: 3.7 G/DL — SIGNIFICANT CHANGE UP (ref 3.3–5)
ALP SERPL-CCNC: 55 U/L — SIGNIFICANT CHANGE UP (ref 40–120)
ALT FLD-CCNC: 40 U/L — SIGNIFICANT CHANGE UP (ref 12–78)
ANION GAP SERPL CALC-SCNC: 3 MMOL/L — LOW (ref 5–17)
APPEARANCE UR: CLEAR — SIGNIFICANT CHANGE UP
APTT BLD: 30.8 SEC — SIGNIFICANT CHANGE UP (ref 27.5–35.5)
AST SERPL-CCNC: 24 U/L — SIGNIFICANT CHANGE UP (ref 15–37)
BASOPHILS # BLD AUTO: 0.03 K/UL — SIGNIFICANT CHANGE UP (ref 0–0.2)
BASOPHILS NFR BLD AUTO: 0.4 % — SIGNIFICANT CHANGE UP (ref 0–2)
BILIRUB SERPL-MCNC: 0.5 MG/DL — SIGNIFICANT CHANGE UP (ref 0.2–1.2)
BILIRUB UR-MCNC: NEGATIVE — SIGNIFICANT CHANGE UP
BUN SERPL-MCNC: 10 MG/DL — SIGNIFICANT CHANGE UP (ref 7–23)
CALCIUM SERPL-MCNC: 8.5 MG/DL — SIGNIFICANT CHANGE UP (ref 8.5–10.1)
CHLORIDE SERPL-SCNC: 110 MMOL/L — HIGH (ref 96–108)
CO2 SERPL-SCNC: 27 MMOL/L — SIGNIFICANT CHANGE UP (ref 22–31)
COLOR SPEC: YELLOW — SIGNIFICANT CHANGE UP
CREAT SERPL-MCNC: 0.91 MG/DL — SIGNIFICANT CHANGE UP (ref 0.5–1.3)
DIFF PNL FLD: NEGATIVE — SIGNIFICANT CHANGE UP
EOSINOPHIL # BLD AUTO: 0.3 K/UL — SIGNIFICANT CHANGE UP (ref 0–0.5)
EOSINOPHIL NFR BLD AUTO: 3.7 % — SIGNIFICANT CHANGE UP (ref 0–6)
GLUCOSE SERPL-MCNC: 82 MG/DL — SIGNIFICANT CHANGE UP (ref 70–99)
GLUCOSE UR QL: NEGATIVE MG/DL — SIGNIFICANT CHANGE UP
HCT VFR BLD CALC: 40.3 % — SIGNIFICANT CHANGE UP (ref 39–50)
HGB BLD-MCNC: 14.2 G/DL — SIGNIFICANT CHANGE UP (ref 13–17)
IMM GRANULOCYTES NFR BLD AUTO: 0.2 % — SIGNIFICANT CHANGE UP (ref 0–1.5)
INR BLD: 1.04 RATIO — SIGNIFICANT CHANGE UP (ref 0.88–1.16)
KETONES UR-MCNC: NEGATIVE — SIGNIFICANT CHANGE UP
LEUKOCYTE ESTERASE UR-ACNC: NEGATIVE — SIGNIFICANT CHANGE UP
LYMPHOCYTES # BLD AUTO: 2.51 K/UL — SIGNIFICANT CHANGE UP (ref 1–3.3)
LYMPHOCYTES # BLD AUTO: 30.6 % — SIGNIFICANT CHANGE UP (ref 13–44)
MCHC RBC-ENTMCNC: 30.2 PG — SIGNIFICANT CHANGE UP (ref 27–34)
MCHC RBC-ENTMCNC: 35.2 GM/DL — SIGNIFICANT CHANGE UP (ref 32–36)
MCV RBC AUTO: 85.7 FL — SIGNIFICANT CHANGE UP (ref 80–100)
MONOCYTES # BLD AUTO: 0.65 K/UL — SIGNIFICANT CHANGE UP (ref 0–0.9)
MONOCYTES NFR BLD AUTO: 7.9 % — SIGNIFICANT CHANGE UP (ref 2–14)
NEUTROPHILS # BLD AUTO: 4.69 K/UL — SIGNIFICANT CHANGE UP (ref 1.8–7.4)
NEUTROPHILS NFR BLD AUTO: 57.2 % — SIGNIFICANT CHANGE UP (ref 43–77)
NITRITE UR-MCNC: NEGATIVE — SIGNIFICANT CHANGE UP
PH UR: 5 — SIGNIFICANT CHANGE UP (ref 5–8)
PLATELET # BLD AUTO: 207 K/UL — SIGNIFICANT CHANGE UP (ref 150–400)
POTASSIUM SERPL-MCNC: 3.8 MMOL/L — SIGNIFICANT CHANGE UP (ref 3.5–5.3)
POTASSIUM SERPL-SCNC: 3.8 MMOL/L — SIGNIFICANT CHANGE UP (ref 3.5–5.3)
PROT SERPL-MCNC: 7.3 GM/DL — SIGNIFICANT CHANGE UP (ref 6–8.3)
PROT UR-MCNC: 15 MG/DL
PROTHROM AB SERPL-ACNC: 12.1 SEC — SIGNIFICANT CHANGE UP (ref 10.6–13.6)
RBC # BLD: 4.7 M/UL — SIGNIFICANT CHANGE UP (ref 4.2–5.8)
RBC # FLD: 12.8 % — SIGNIFICANT CHANGE UP (ref 10.3–14.5)
SODIUM SERPL-SCNC: 140 MMOL/L — SIGNIFICANT CHANGE UP (ref 135–145)
SP GR SPEC: 1.02 — SIGNIFICANT CHANGE UP (ref 1.01–1.02)
UROBILINOGEN FLD QL: NEGATIVE MG/DL — SIGNIFICANT CHANGE UP
WBC # BLD: 8.2 K/UL — SIGNIFICANT CHANGE UP (ref 3.8–10.5)
WBC # FLD AUTO: 8.2 K/UL — SIGNIFICANT CHANGE UP (ref 3.8–10.5)

## 2021-07-01 PROCEDURE — 86900 BLOOD TYPING SEROLOGIC ABO: CPT

## 2021-07-01 PROCEDURE — 36415 COLL VENOUS BLD VENIPUNCTURE: CPT

## 2021-07-01 PROCEDURE — 86901 BLOOD TYPING SEROLOGIC RH(D): CPT

## 2021-07-01 PROCEDURE — 81001 URINALYSIS AUTO W/SCOPE: CPT

## 2021-07-01 PROCEDURE — G0463: CPT | Mod: 25

## 2021-07-01 PROCEDURE — 87086 URINE CULTURE/COLONY COUNT: CPT

## 2021-07-01 PROCEDURE — 93010 ELECTROCARDIOGRAM REPORT: CPT

## 2021-07-01 PROCEDURE — 93005 ELECTROCARDIOGRAM TRACING: CPT

## 2021-07-01 PROCEDURE — 85025 COMPLETE CBC W/AUTO DIFF WBC: CPT

## 2021-07-01 PROCEDURE — 87640 STAPH A DNA AMP PROBE: CPT

## 2021-07-01 PROCEDURE — 85730 THROMBOPLASTIN TIME PARTIAL: CPT

## 2021-07-01 PROCEDURE — 80053 COMPREHEN METABOLIC PANEL: CPT

## 2021-07-01 PROCEDURE — 87641 MR-STAPH DNA AMP PROBE: CPT

## 2021-07-01 PROCEDURE — 86850 RBC ANTIBODY SCREEN: CPT

## 2021-07-01 PROCEDURE — 83036 HEMOGLOBIN GLYCOSYLATED A1C: CPT

## 2021-07-01 PROCEDURE — 85610 PROTHROMBIN TIME: CPT

## 2021-07-01 PROCEDURE — 71046 X-RAY EXAM CHEST 2 VIEWS: CPT | Mod: 26

## 2021-07-01 PROCEDURE — 71046 X-RAY EXAM CHEST 2 VIEWS: CPT

## 2021-07-01 NOTE — H&P PST ADULT - NSICDXFAMILYHX_GEN_ALL_CORE_FT
FAMILY HISTORY:  Family history of hyperthyroidism    Father  Still living? Yes, Estimated age: Age Unknown  Family history of coronary artery disease, Age at diagnosis: Age Unknown  Family history of diabetes mellitus, Age at diagnosis: Age Unknown    Mother  Still living? Yes, Estimated age: Age Unknown  Family history of hypertension, Age at diagnosis: Age Unknown

## 2021-07-01 NOTE — H&P PST ADULT - NSICDXPASTMEDICALHX_GEN_ALL_CORE_FT
PAST MEDICAL HISTORY:  2019 novel coronavirus disease (COVID-19) 3/2020    ADHD     Anxiety and depression     Bronchitis annual    COVID-19 vaccine series completed Pfizer 1/2021    GERD (gastroesophageal reflux disease)     IBS (irritable bowel syndrome)     Lumbar herniated disc L5 S1 left    Obese     Renal calculi     Seasonal allergies     Sleep apnea

## 2021-07-01 NOTE — H&P PST ADULT - NSICDXPASTSURGICALHX_GEN_ALL_CORE_FT
PAST SURGICAL HISTORY:  S/P laparoscopic sleeve gastrectomy 12/2018    S/P tonsillectomy and adenoidectomy

## 2021-07-01 NOTE — H&P PST ADULT - HISTORY OF PRESENT ILLNESS
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 He said he slept on the couch one day and woke up with severe back pain. c/0 numbness tingling left leg denies weakness. he presents to PST for planned Left L5 S1 laminotomy and excision  of herniated disc

## 2021-07-01 NOTE — H&P PST ADULT - ASSESSMENT
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 He said he slept on the couch one day and woke up with severe back pain. c/0 numbness tingling left leg denies weakness. he presents to PST for planned Left L5 S1 laminotomy and excision  of herniated disc     Plan:  1. PST instructions given ; NPO status instructions to be given by ASU   2. Pt instructed to take following meds with sip of water : wellbutrin prozac omeprazole   3. Pt instructed to take routine evening medications unless indicated   4. Stop NSAIDS ( Aspirin Alev Motrin Mobic Diclofenac), herbal supplements , MVI , Vitamin fish oil 7 days prior to surgery  unless   directed by surgeon or cardiologist;   5. Medical Optimization  with Dr Frankel   6. EZ wash instructions given & mupirocin instructions given  7. Labs EKG CXR as per surgeon request   8. Pt instructed to self quarantine after Covid test   9. Covid Testing scheduled Pt notified and aware  10. Pt denies covid symptoms shortness of breath fever cough

## 2021-07-01 NOTE — H&P PST ADULT - NSALCOHOLAMT_GEN_A_CORE_SD
Juan Alberto Putnam     1-2 drinks

## 2021-07-02 DIAGNOSIS — Z01.818 ENCOUNTER FOR OTHER PREPROCEDURAL EXAMINATION: ICD-10-CM

## 2021-07-02 DIAGNOSIS — M51.36 OTHER INTERVERTEBRAL DISC DEGENERATION, LUMBAR REGION: ICD-10-CM

## 2021-07-02 DIAGNOSIS — M51.27 OTHER INTERVERTEBRAL DISC DISPLACEMENT, LUMBOSACRAL REGION: ICD-10-CM

## 2021-07-02 LAB
A1C WITH ESTIMATED AVERAGE GLUCOSE RESULT: 4.7 % — SIGNIFICANT CHANGE UP (ref 4–5.6)
ESTIMATED AVERAGE GLUCOSE: 88 MG/DL — SIGNIFICANT CHANGE UP (ref 68–114)
MRSA PCR RESULT.: SIGNIFICANT CHANGE UP
S AUREUS DNA NOSE QL NAA+PROBE: SIGNIFICANT CHANGE UP

## 2021-07-03 LAB
CULTURE RESULTS: SIGNIFICANT CHANGE UP
SPECIMEN SOURCE: SIGNIFICANT CHANGE UP

## 2021-07-04 ENCOUNTER — APPOINTMENT (OUTPATIENT)
Dept: DISASTER EMERGENCY | Facility: CLINIC | Age: 31
End: 2021-07-04

## 2021-07-04 PROBLEM — N20.0 CALCULUS OF KIDNEY: Chronic | Status: ACTIVE | Noted: 2021-07-01

## 2021-07-04 PROBLEM — K21.9 GASTRO-ESOPHAGEAL REFLUX DISEASE WITHOUT ESOPHAGITIS: Chronic | Status: ACTIVE | Noted: 2021-07-01

## 2021-07-04 PROBLEM — F90.9 ATTENTION-DEFICIT HYPERACTIVITY DISORDER, UNSPECIFIED TYPE: Chronic | Status: ACTIVE | Noted: 2021-07-01

## 2021-07-04 PROBLEM — E66.9 OBESITY, UNSPECIFIED: Chronic | Status: ACTIVE | Noted: 2021-07-01

## 2021-07-04 PROBLEM — U07.1 COVID-19: Chronic | Status: ACTIVE | Noted: 2021-07-01

## 2021-07-04 PROBLEM — M51.26 OTHER INTERVERTEBRAL DISC DISPLACEMENT, LUMBAR REGION: Chronic | Status: ACTIVE | Noted: 2021-07-01

## 2021-07-04 PROBLEM — F41.9 ANXIETY DISORDER, UNSPECIFIED: Chronic | Status: ACTIVE | Noted: 2021-07-01

## 2021-07-04 PROBLEM — Z92.29 PERSONAL HISTORY OF OTHER DRUG THERAPY: Chronic | Status: ACTIVE | Noted: 2021-07-01

## 2021-07-04 LAB — SARS-COV-2 N GENE NPH QL NAA+PROBE: NOT DETECTED

## 2021-07-08 ENCOUNTER — OUTPATIENT (OUTPATIENT)
Dept: INPATIENT UNIT | Facility: HOSPITAL | Age: 31
LOS: 1 days | Discharge: HOME | End: 2021-07-08
Payer: COMMERCIAL

## 2021-07-08 ENCOUNTER — RESULT REVIEW (OUTPATIENT)
Age: 31
End: 2021-07-08

## 2021-07-08 VITALS
HEIGHT: 70 IN | WEIGHT: 250 LBS | SYSTOLIC BLOOD PRESSURE: 154 MMHG | RESPIRATION RATE: 16 BRPM | OXYGEN SATURATION: 100 % | HEART RATE: 90 BPM | TEMPERATURE: 98 F | DIASTOLIC BLOOD PRESSURE: 90 MMHG

## 2021-07-08 VITALS
TEMPERATURE: 98 F | HEART RATE: 82 BPM | DIASTOLIC BLOOD PRESSURE: 84 MMHG | RESPIRATION RATE: 18 BRPM | OXYGEN SATURATION: 98 % | SYSTOLIC BLOOD PRESSURE: 136 MMHG

## 2021-07-08 DIAGNOSIS — M51.36 OTHER INTERVERTEBRAL DISC DEGENERATION, LUMBAR REGION: ICD-10-CM

## 2021-07-08 DIAGNOSIS — Z98.84 BARIATRIC SURGERY STATUS: Chronic | ICD-10-CM

## 2021-07-08 DIAGNOSIS — Z98.89 OTHER SPECIFIED POSTPROCEDURAL STATES: Chronic | ICD-10-CM

## 2021-07-08 DIAGNOSIS — M51.27 OTHER INTERVERTEBRAL DISC DISPLACEMENT, LUMBOSACRAL REGION: ICD-10-CM

## 2021-07-08 PROCEDURE — 82962 GLUCOSE BLOOD TEST: CPT

## 2021-07-08 PROCEDURE — 76000 FLUOROSCOPY <1 HR PHYS/QHP: CPT

## 2021-07-08 PROCEDURE — 88304 TISSUE EXAM BY PATHOLOGIST: CPT | Mod: 26

## 2021-07-08 PROCEDURE — 88304 TISSUE EXAM BY PATHOLOGIST: CPT

## 2021-07-08 PROCEDURE — C1889: CPT

## 2021-07-08 RX ORDER — HYDROMORPHONE HYDROCHLORIDE 2 MG/ML
0.5 INJECTION INTRAMUSCULAR; INTRAVENOUS; SUBCUTANEOUS
Refills: 0 | Status: DISCONTINUED | OUTPATIENT
Start: 2021-07-08 | End: 2021-07-08

## 2021-07-08 RX ORDER — SODIUM CHLORIDE 9 MG/ML
1000 INJECTION, SOLUTION INTRAVENOUS
Refills: 0 | Status: DISCONTINUED | OUTPATIENT
Start: 2021-07-08 | End: 2021-07-08

## 2021-07-08 RX ORDER — OXYCODONE HYDROCHLORIDE 5 MG/1
5 TABLET ORAL ONCE
Refills: 0 | Status: DISCONTINUED | OUTPATIENT
Start: 2021-07-08 | End: 2021-07-08

## 2021-07-08 RX ORDER — FENTANYL CITRATE 50 UG/ML
50 INJECTION INTRAVENOUS
Refills: 0 | Status: DISCONTINUED | OUTPATIENT
Start: 2021-07-08 | End: 2021-07-08

## 2021-07-08 RX ORDER — GABAPENTIN 400 MG/1
300 CAPSULE ORAL ONCE
Refills: 0 | Status: COMPLETED | OUTPATIENT
Start: 2021-07-08 | End: 2021-07-08

## 2021-07-08 RX ORDER — CELECOXIB 200 MG/1
200 CAPSULE ORAL ONCE
Refills: 0 | Status: COMPLETED | OUTPATIENT
Start: 2021-07-08 | End: 2021-07-08

## 2021-07-08 RX ORDER — ONDANSETRON 8 MG/1
4 TABLET, FILM COATED ORAL ONCE
Refills: 0 | Status: DISCONTINUED | OUTPATIENT
Start: 2021-07-08 | End: 2021-07-08

## 2021-07-08 RX ORDER — ACETAMINOPHEN 500 MG
1000 TABLET ORAL ONCE
Refills: 0 | Status: COMPLETED | OUTPATIENT
Start: 2021-07-08 | End: 2021-07-08

## 2021-07-08 RX ORDER — OXYCODONE HYDROCHLORIDE 5 MG/1
1 TABLET ORAL
Qty: 30 | Refills: 0
Start: 2021-07-08 | End: 2021-07-12

## 2021-07-08 RX ORDER — OXYCODONE HYDROCHLORIDE 5 MG/1
10 TABLET ORAL ONCE
Refills: 0 | Status: DISCONTINUED | OUTPATIENT
Start: 2021-07-08 | End: 2021-07-08

## 2021-07-08 RX ADMIN — GABAPENTIN 300 MILLIGRAM(S): 400 CAPSULE ORAL at 11:33

## 2021-07-08 RX ADMIN — HYDROMORPHONE HYDROCHLORIDE 0.5 MILLIGRAM(S): 2 INJECTION INTRAMUSCULAR; INTRAVENOUS; SUBCUTANEOUS at 17:49

## 2021-07-08 RX ADMIN — OXYCODONE HYDROCHLORIDE 10 MILLIGRAM(S): 5 TABLET ORAL at 11:33

## 2021-07-08 RX ADMIN — SODIUM CHLORIDE 75 MILLILITER(S): 9 INJECTION, SOLUTION INTRAVENOUS at 17:52

## 2021-07-08 RX ADMIN — CELECOXIB 200 MILLIGRAM(S): 200 CAPSULE ORAL at 11:32

## 2021-07-08 RX ADMIN — Medication 400 MILLIGRAM(S): at 17:48

## 2021-07-08 RX ADMIN — CELECOXIB 200 MILLIGRAM(S): 200 CAPSULE ORAL at 11:33

## 2021-07-08 NOTE — ASU DISCHARGE PLAN (ADULT/PEDIATRIC) - CARE PROVIDER_API CALL
Becky Tariq)  Orthopaedic Surgery  [address] [place] [address], [address] [place] [address]  Phone: [phone]  Fax: [phone]  Follow Up Time:

## 2021-07-08 NOTE — BRIEF OPERATIVE NOTE - NSICDXBRIEFPROCEDURE_GEN_ALL_CORE_FT
PROCEDURES:  Lumbar laminectomy with discectomy or microdiscectomy 08-Jul-2021 17:02:05  Braeden Stewart

## 2021-07-08 NOTE — ASU DISCHARGE PLAN (ADULT/PEDIATRIC) - NURSING INSTRUCTIONS
Begin with liquids and light food ( tea, toast, Jello, soups). Advance to what you normally eat. Liquids should taken in adequate amounts today.
n/a

## 2021-07-08 NOTE — ASU PREOP CHECKLIST - AS BP NONINV SITE
Patient: Abe Jensen Date: 2018   : 1941 Attending: Leticia Hwang MD   76 year old male        Consult Diagnosis:  GIB 2nd opinion     Subjective: Pt doing well. Lying in bed. Over the weekend had nausea, vomiting, or worsening abdominal pain. Received Reglan. j tube clogged yesterday and was declogged. Feels better now. Has formed stools.     HPI( 18): This is a 76 year old male with extensive PMHx notable for multiple ischemic strokes (with baseline expressive and receptive aphasia), Atrial fibrillation, stage III CKD, T2DM, GERD, blood clot associated with vein wall inflammation, patentn foramen ovale s/p closure. Pt originally presented on 18 with abdominal pain/ distention and N/V. Hospital course has been remarkable for mesenteric ischemic s/p exp lap with right colon resection and end ileostomy with Dr Sauceda on . S/p return to the OR on for exp lap with extensive small bowel resection and partial ascending colon resection. S/p exp lap on  fir end-to-end jejunum to transverse colon anasotomsis, G-J tube placement. Pt started TPN on . Extubated on . It appears that patient has been followed by [facility] during hospitalization for gastric outlet obstruction. In fact, pt just underwent EGD yesterday with Dr. Milton (18) for possible advancement of GJ tube and further evaluation. Procedure revealed no evidence of GOO, significant amount of retained material in stomach, no blood or bleeding lesions, limited visualization 2/2 retained debris, although examined portion appeared normal. However, UGI from 18 reveals that the triple-lumen line traversing and terminating within the stomach shows that there is a kink with partial break of the line prior to entering the gastric body. The two patent lines both terminate within the stomach and are not postpyloric. Significant drop in Hgb 7.5-->5.6 this AM within setting of melena. Per RN, 3 episode of black  left upper arm stools overnight. S/p 2 units of PRBCs 1/28/18. Repeat Hgb 7.5. G-tube output during encounter appears brown/blood tinged. Heparin gtt on hold. ASA supp held. On PPI gtt.       Reviewed Pertinent: PMH/FH/SH/Meds Reviewed       Vital 24 Hour Range Last Value   Temperature Temp  Min: 97.5 °F (36.4 °C)  Max: 98.4 °F (36.9 °C) 98.4 °F (36.9 °C) (02/26/18 0821)   Pulse Pulse  Min: 88  Max: 102 94 (02/26/18 0821)   Respiratory Resp  Min: 18  Max: 18 18 (02/26/18 0821)   Non-Invasive  Blood Pressure BP  Min: 128/66  Max: 170/80 144/62 (02/26/18 0821)   Arterial   Blood Pressure No Data Recorded 168/48 (01/23/18 0800)     Vital First Value Last Value   Weight Weight: 94.3 kg (01/13/18 0800) 87 kg (02/26/18 0821)   Height N/A 5' 11\" (180.3 cm) (01/19/18 1456)   BMI N/A 26.81 (02/26/18 0821)       Review of Systems: No fevers, CP, SOB. No nausea, vomiting, or worsening abdominal pain.     Physical Exam:   Vitals:    Visit Vitals  /62 (BP Location: [facility], Patient Position: Semi-Mcallister's)   Pulse 94   Temp 98.4 °F (36.9 °C) (Oral)   Resp 18   Ht 5' 11\" (1.803 m)   Wt 87 kg   SpO2 100%   BMI 26.75 kg/m²      General appearance: alert, up in chair sitting comfortable, NAD  Heart: regular rate and rhythm, S1, S2 normal, no murmur  Abdomen: non distended, BS+, wound vac in place, +G-J tube. J-tube feedings.   Ext: no edema    Laboratory Results:    Recent Labs  Lab 02/26/18  0550 02/25/18  0550 02/24/18  0940 02/24/18  0606 02/23/18  2126   WBC 13.6* 14.7*  --  10.9  --    HCT 28.8* 29.9*  --  27.8*  --    HGB 9.6* 10.0*  --  9.3*  --    MCV 87.3 87.9  --  88.0  --     334  --  314  --    INR  --  3.5 2.5 1.0  --    SODIUM 132* 131*  --  130*  --    POTASSIUM 4.3 4.4  --  4.3  --    GLUCOSE 118* 138*  --  121*  --    BUN 23* 22*  --  24*  --    CREATININE 1.20* 1.21*  --  1.18*  --    AST 33 38*  --  37  --    GPT 91* 99*  --  106*  --    ALKPT 84 88  --  83  --    BILIRUBIN 0.6 0.6  --  0.5  --    ALBUMIN 2.8* 2.8*  --   2.8*  --    CDPC  --   --   --   --  NOT DETECTED     Assessment:  76 year old male with complex PMHx, hospitalization notable for mesenteric ischemia s/p extensive bowel resection, GOO and GIB.      1. Mesenteric ischemia             --(1/13) S/p exp lap with right colon rection and ilesotomy             --(1/17) S/p exp lap with extensive small bowel resction and partial ascending colon resection             --(1/19) S/p exp lap bowel resction, jejunum to transverse colon anastomosis, G-J tube placement   -- (2/6) removal of G-J tube and replaced with externally removable PEG-J tube     2. Gastric outlet obtruction- none seen on EGD 1/27. ?gastroparesis.                --Kathy J tube feeds and kathy soft/bland diet. TPN decreased    --Per RD, TPN should be d/c'ed when TF + oral intake exceed 1600 caloried and 75 gm protein. (On 2/19, oral intake + TF provided [phone] maría elena).   --RD recommendations for TFs noted. On banana flakes per G tube TID.  Calorie count complete per RD okay to discontinue TPN, continue TF's and oral intake.      3. Normocytic anemia              -Hgb low but stable             -No overt GI bleed             -s/p EGD 1/28/18 with Dr. Moser-  Small HH, large amt old blood in fundus, 5mm blood clot in cardia s/p epi, J tube coiled in stomach, mild to mod gastritis    4. Acute on stage III CKD.   5. Hypervolemia  6. Leukocytosis, resolved.  Afebrile.  7. H/o CVA (with baseline expressive and receptive aphasia), A fib, pacemaker  8. Min elevated transaminases, liver normal on CT 1/23, may be due to TPN     Plans/Recommendations:     Will advance to high fiber diet  Continue J tube feeds at 40 cc/hr  Continue Reglan 5 mg IV TID prn for nausea.   Monitor stools and H/H  Will check B12, folate, iron studies, Zn, Se and Cu levels  Continue supportive cares.    Mike Thurston MD

## 2021-07-08 NOTE — ASU DISCHARGE PLAN (ADULT/PEDIATRIC) - ASU DC SPECIAL INSTRUCTIONSFT
Follow up with Dr. Zacarias in 7-10 days. Call office for appointment. Take medications as prescribed, pain Rx was sent to your pharmacy. Keep dressing clean, dry, and intact. Ok to shower, no baths. Rest, ice.

## 2021-07-10 DIAGNOSIS — M51.36 OTHER INTERVERTEBRAL DISC DEGENERATION, LUMBAR REGION: ICD-10-CM

## 2021-07-10 DIAGNOSIS — M51.26 OTHER INTERVERTEBRAL DISC DISPLACEMENT, LUMBAR REGION: ICD-10-CM

## 2021-07-11 NOTE — ED ADULT NURSE NOTE - NSFALLRSKASSESSDT_ED_ALL_ED
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

## 2021-07-21 NOTE — H&P PST ADULT - BP NONINVASIVE MEAN (MM HG)
Patient states he has enough PTO to get to residential and does not have to go back to work    He wants the sleep aid medication that is a narcotic. Also wants a letter stating he is on this medication and unable to fly. 104

## 2021-08-30 ENCOUNTER — EMERGENCY (EMERGENCY)
Facility: HOSPITAL | Age: 31
LOS: 1 days | Discharge: HOME | End: 2021-08-30
Attending: EMERGENCY MEDICINE | Admitting: EMERGENCY MEDICINE
Payer: COMMERCIAL

## 2021-08-30 VITALS
RESPIRATION RATE: 18 BRPM | HEART RATE: 78 BPM | HEIGHT: 70 IN | WEIGHT: 285.06 LBS | SYSTOLIC BLOOD PRESSURE: 160 MMHG | TEMPERATURE: 99 F | DIASTOLIC BLOOD PRESSURE: 93 MMHG

## 2021-08-30 VITALS
DIASTOLIC BLOOD PRESSURE: 90 MMHG | HEART RATE: 80 BPM | OXYGEN SATURATION: 100 % | RESPIRATION RATE: 18 BRPM | SYSTOLIC BLOOD PRESSURE: 159 MMHG | TEMPERATURE: 99 F

## 2021-08-30 DIAGNOSIS — Z98.1 ARTHRODESIS STATUS: ICD-10-CM

## 2021-08-30 DIAGNOSIS — Z87.442 PERSONAL HISTORY OF URINARY CALCULI: ICD-10-CM

## 2021-08-30 DIAGNOSIS — F90.8 ATTENTION-DEFICIT HYPERACTIVITY DISORDER, OTHER TYPE: ICD-10-CM

## 2021-08-30 DIAGNOSIS — M54.5 LOW BACK PAIN: ICD-10-CM

## 2021-08-30 DIAGNOSIS — K58.9 IRRITABLE BOWEL SYNDROME WITHOUT DIARRHEA: ICD-10-CM

## 2021-08-30 DIAGNOSIS — G47.30 SLEEP APNEA, UNSPECIFIED: ICD-10-CM

## 2021-08-30 DIAGNOSIS — Z98.84 BARIATRIC SURGERY STATUS: Chronic | ICD-10-CM

## 2021-08-30 DIAGNOSIS — Z20.822 CONTACT WITH AND (SUSPECTED) EXPOSURE TO COVID-19: ICD-10-CM

## 2021-08-30 DIAGNOSIS — Z98.89 OTHER SPECIFIED POSTPROCEDURAL STATES: Chronic | ICD-10-CM

## 2021-08-30 DIAGNOSIS — K21.9 GASTRO-ESOPHAGEAL REFLUX DISEASE WITHOUT ESOPHAGITIS: ICD-10-CM

## 2021-08-30 DIAGNOSIS — F32.9 MAJOR DEPRESSIVE DISORDER, SINGLE EPISODE, UNSPECIFIED: ICD-10-CM

## 2021-08-30 DIAGNOSIS — Z98.84 BARIATRIC SURGERY STATUS: ICD-10-CM

## 2021-08-30 DIAGNOSIS — R20.0 ANESTHESIA OF SKIN: ICD-10-CM

## 2021-08-30 DIAGNOSIS — Z88.7 ALLERGY STATUS TO SERUM AND VACCINE: ICD-10-CM

## 2021-08-30 DIAGNOSIS — F41.9 ANXIETY DISORDER, UNSPECIFIED: ICD-10-CM

## 2021-08-30 DIAGNOSIS — Z86.16 PERSONAL HISTORY OF COVID-19: ICD-10-CM

## 2021-08-30 DIAGNOSIS — E66.9 OBESITY, UNSPECIFIED: ICD-10-CM

## 2021-08-30 LAB
ALBUMIN SERPL ELPH-MCNC: 4.1 G/DL — SIGNIFICANT CHANGE UP (ref 3.3–5)
ALP SERPL-CCNC: 50 U/L — SIGNIFICANT CHANGE UP (ref 40–120)
ALT FLD-CCNC: SIGNIFICANT CHANGE UP U/L (ref 10–45)
ANION GAP SERPL CALC-SCNC: 12 MMOL/L — SIGNIFICANT CHANGE UP (ref 5–17)
AST SERPL-CCNC: SIGNIFICANT CHANGE UP U/L (ref 10–40)
BASOPHILS # BLD AUTO: 0.04 K/UL — SIGNIFICANT CHANGE UP (ref 0–0.2)
BASOPHILS NFR BLD AUTO: 0.4 % — SIGNIFICANT CHANGE UP (ref 0–2)
BILIRUB SERPL-MCNC: 0.5 MG/DL — SIGNIFICANT CHANGE UP (ref 0.2–1.2)
BUN SERPL-MCNC: 10 MG/DL — SIGNIFICANT CHANGE UP (ref 7–23)
CALCIUM SERPL-MCNC: 9.4 MG/DL — SIGNIFICANT CHANGE UP (ref 8.4–10.5)
CHLORIDE SERPL-SCNC: 104 MMOL/L — SIGNIFICANT CHANGE UP (ref 96–108)
CO2 SERPL-SCNC: 20 MMOL/L — LOW (ref 22–31)
CREAT SERPL-MCNC: 0.89 MG/DL — SIGNIFICANT CHANGE UP (ref 0.5–1.3)
CRP SERPL-MCNC: <3 MG/L — SIGNIFICANT CHANGE UP (ref 0–4)
EOSINOPHIL # BLD AUTO: 0.19 K/UL — SIGNIFICANT CHANGE UP (ref 0–0.5)
EOSINOPHIL NFR BLD AUTO: 2 % — SIGNIFICANT CHANGE UP (ref 0–6)
ERYTHROCYTE [SEDIMENTATION RATE] IN BLOOD: 9 MM/HR — SIGNIFICANT CHANGE UP
GLUCOSE SERPL-MCNC: 84 MG/DL — SIGNIFICANT CHANGE UP (ref 70–99)
HCT VFR BLD CALC: 41.4 % — SIGNIFICANT CHANGE UP (ref 39–50)
HGB BLD-MCNC: 14.4 G/DL — SIGNIFICANT CHANGE UP (ref 13–17)
IMM GRANULOCYTES NFR BLD AUTO: 0.3 % — SIGNIFICANT CHANGE UP (ref 0–1.5)
LYMPHOCYTES # BLD AUTO: 2.89 K/UL — SIGNIFICANT CHANGE UP (ref 1–3.3)
LYMPHOCYTES # BLD AUTO: 31 % — SIGNIFICANT CHANGE UP (ref 13–44)
MCHC RBC-ENTMCNC: 29.8 PG — SIGNIFICANT CHANGE UP (ref 27–34)
MCHC RBC-ENTMCNC: 34.8 GM/DL — SIGNIFICANT CHANGE UP (ref 32–36)
MCV RBC AUTO: 85.7 FL — SIGNIFICANT CHANGE UP (ref 80–100)
MONOCYTES # BLD AUTO: 0.56 K/UL — SIGNIFICANT CHANGE UP (ref 0–0.9)
MONOCYTES NFR BLD AUTO: 6 % — SIGNIFICANT CHANGE UP (ref 2–14)
NEUTROPHILS # BLD AUTO: 5.61 K/UL — SIGNIFICANT CHANGE UP (ref 1.8–7.4)
NEUTROPHILS NFR BLD AUTO: 60.3 % — SIGNIFICANT CHANGE UP (ref 43–77)
NRBC # BLD: 0 /100 WBCS — SIGNIFICANT CHANGE UP (ref 0–0)
PLATELET # BLD AUTO: 263 K/UL — SIGNIFICANT CHANGE UP (ref 150–400)
POTASSIUM SERPL-MCNC: SIGNIFICANT CHANGE UP MMOL/L (ref 3.5–5.3)
POTASSIUM SERPL-SCNC: SIGNIFICANT CHANGE UP MMOL/L (ref 3.5–5.3)
PROT SERPL-MCNC: 7.1 G/DL — SIGNIFICANT CHANGE UP (ref 6–8.3)
RBC # BLD: 4.83 M/UL — SIGNIFICANT CHANGE UP (ref 4.2–5.8)
RBC # FLD: 12.8 % — SIGNIFICANT CHANGE UP (ref 10.3–14.5)
SARS-COV-2 RNA SPEC QL NAA+PROBE: NEGATIVE — SIGNIFICANT CHANGE UP
SODIUM SERPL-SCNC: 136 MMOL/L — SIGNIFICANT CHANGE UP (ref 135–145)
WBC # BLD: 9.32 K/UL — SIGNIFICANT CHANGE UP (ref 3.8–10.5)
WBC # FLD AUTO: 9.32 K/UL — SIGNIFICANT CHANGE UP (ref 3.8–10.5)

## 2021-08-30 PROCEDURE — 72158 MRI LUMBAR SPINE W/O & W/DYE: CPT | Mod: 26,MG

## 2021-08-30 PROCEDURE — 99284 EMERGENCY DEPT VISIT MOD MDM: CPT | Mod: 25

## 2021-08-30 PROCEDURE — 96374 THER/PROPH/DIAG INJ IV PUSH: CPT | Mod: XU

## 2021-08-30 PROCEDURE — 99285 EMERGENCY DEPT VISIT HI MDM: CPT

## 2021-08-30 PROCEDURE — 72158 MRI LUMBAR SPINE W/O & W/DYE: CPT | Mod: MG

## 2021-08-30 PROCEDURE — G1004: CPT

## 2021-08-30 PROCEDURE — A9585: CPT

## 2021-08-30 PROCEDURE — 80053 COMPREHEN METABOLIC PANEL: CPT

## 2021-08-30 PROCEDURE — 85652 RBC SED RATE AUTOMATED: CPT

## 2021-08-30 PROCEDURE — 85025 COMPLETE CBC W/AUTO DIFF WBC: CPT

## 2021-08-30 PROCEDURE — 86140 C-REACTIVE PROTEIN: CPT

## 2021-08-30 PROCEDURE — 87635 SARS-COV-2 COVID-19 AMP PRB: CPT

## 2021-08-30 PROCEDURE — 36415 COLL VENOUS BLD VENIPUNCTURE: CPT

## 2021-08-30 RX ORDER — ONDANSETRON 8 MG/1
4 TABLET, FILM COATED ORAL ONCE
Refills: 0 | Status: COMPLETED | OUTPATIENT
Start: 2021-08-30 | End: 2021-08-30

## 2021-08-30 RX ORDER — OXYCODONE AND ACETAMINOPHEN 5; 325 MG/1; MG/1
1 TABLET ORAL ONCE
Refills: 0 | Status: DISCONTINUED | OUTPATIENT
Start: 2021-08-30 | End: 2021-08-30

## 2021-08-30 RX ORDER — OXYCODONE AND ACETAMINOPHEN 5; 325 MG/1; MG/1
2 TABLET ORAL ONCE
Refills: 0 | Status: DISCONTINUED | OUTPATIENT
Start: 2021-08-30 | End: 2021-08-30

## 2021-08-30 RX ADMIN — OXYCODONE AND ACETAMINOPHEN 1 TABLET(S): 5; 325 TABLET ORAL at 21:35

## 2021-08-30 RX ADMIN — ONDANSETRON 4 MILLIGRAM(S): 8 TABLET, FILM COATED ORAL at 21:47

## 2021-08-30 RX ADMIN — OXYCODONE AND ACETAMINOPHEN 1 TABLET(S): 5; 325 TABLET ORAL at 16:45

## 2021-08-30 RX ADMIN — OXYCODONE AND ACETAMINOPHEN 1 TABLET(S): 5; 325 TABLET ORAL at 21:05

## 2021-08-30 RX ADMIN — OXYCODONE AND ACETAMINOPHEN 1 TABLET(S): 5; 325 TABLET ORAL at 23:44

## 2021-08-30 RX ADMIN — OXYCODONE AND ACETAMINOPHEN 1 TABLET(S): 5; 325 TABLET ORAL at 17:30

## 2021-08-30 NOTE — ED PROVIDER NOTE - ATTENDING CONTRIBUTION TO CARE
as per HPI. Vitals wnl, exam as above. Well appearing, steady gait, +subjective numbness to LLE and decreased plantar/dorsiflexion. normal pulses and cap refill. PA contacted Dr. Larry team, awaiting call back. MRI, labs, symptom control, reassess.

## 2021-08-30 NOTE — ED PROVIDER NOTE - NSFOLLOWUPINSTRUCTIONS_ED_ALL_ED_FT
Your results were discussed with Dr. Kelsey and our spine team. Please follow up in office in the next 24-36 hours.    Return to the Emergency Department if you develop fever>100.4F, worsening pain, increased weakness, loss of control of bowels or bladder or any other concerns.

## 2021-08-30 NOTE — ED ADULT NURSE NOTE - OBJECTIVE STATEMENT
32 yo M presents to ED co lower back pain with numbness radiating down L leg.  Pt had laminectomy in January 2021 and had follow up MRI for tm, advised to present to [facility] today d/t worsening numbness and pain. Pt A&Ox4, ambulatory with steady gait, speaking in clear/full sentences, no acute distress, vital signs stable. Full ROM of all extremities, sensation intact. Denies loss of control of bowels or bladder.

## 2021-08-30 NOTE — ED PROVIDER NOTE - PHYSICAL EXAMINATION
Vitals reviewed  Gen: anxious/upset appearing but nad, speaking in full sentences  Skin: wwp, no rash/lesions  HEENT: ncat, eomi, mmm  Back: lumbar surgical scar healed, no erythema or warmth, no discharge, + ttp over lumbar region without step off  CV: rrr, no audible m/r/g  Resp: symmetrical expansion, ctab, no w/r/r  Abd: nondistended, soft/nt  GRAHAM: rectal tone intact   Ext: FROM throughout, no peripheral edema, 5/5 strength all ext, reports diminished sensation LLE compared to RLE, distal pulses 2+  Neuro: alert/oriented, no focal deficits, steady gait without assistance

## 2021-08-30 NOTE — ED PROVIDER NOTE - OBJECTIVE STATEMENT
31 M [facility] gastric bypass, lumbar laminectomy 7/8/21 w/ Dr. Tariq p/w back pain x one week and LLE numbness x 3 hours.  Pt reports back pain was controlled after surgery but over past week has returned.  went to see surgeon on Thursday and scheduled for outpt MRI but today after bending down he started to experience numbness in LLE.  He has been taking motrin/gabapentin with some improvement.  Denies f/c, neck pain, headache, dizziness, fainting, chest pain, sob, abd pain, nvd, bowel or bladder dysfunction, weakness, difficulty ambulating, fall/trauma or heavy lifting.

## 2021-08-30 NOTE — ED PROVIDER NOTE - CARE PLAN
1 Principal Discharge DX:	Low back pain   Principal Discharge DX:	Low back pain  Secondary Diagnosis:	Numbness of leg

## 2021-08-30 NOTE — ED ADULT TRIAGE NOTE - CHIEF COMPLAINT QUOTE
Patient has Hx of laminectomy Jan. 2021 L5 to S1, 20% disc removed.  Saw jacinto this week due to pain, scheduled for MRI, unable to wait due to increasing pain on lower back and left leg numbness w/ shooting pain 4/10.  Able to bear weight and ambulate w/out difficulty.

## 2021-08-30 NOTE — ED ADULT NURSE NOTE - CAS DISC DELAYS ENTER DETAILS FT
pt. refused to leave, stated he has difficulty walking, pt. was spoken to by MD Jb Gonzalez. pt. requested to speak to AND, AND was notified.

## 2021-08-30 NOTE — ED ADULT NURSE REASSESSMENT NOTE - NS ED NURSE REASSESS COMMENT FT1
Patient back from MRI and awaiting results, patient aware with plan of care, Pt A&Ox4, ambulatory with steady gait, speaking in clear/full sentences, no acute distress. Ambulated ot bathroom independently. all safety measures maintained.

## 2021-08-30 NOTE — ED PROVIDER NOTE - PROGRESS NOTE DETAILS
Arely: rediscussed w/ MRI manager Leopoldo. pt to go to MRI within the hour. Still awaiting call back from Dr. Larry team (PA called earlier). Will attempt again to reach out to them. Arely: rediscussed w/ MRI manager Leopoldo. pt to go to MRI within the hour. Still awaiting call back from Dr. Larry team (PA called [phone]). Will attempt again to reach out to them. Klepfish: d/w dr. valdez covering for dr. buchanan. MRI pending. If significant findings then rediscuss w/ dr. valdez, otherwise likely outpt f/u. updated pt. klepfish: Pt at MRI. Pending: MRI results, reassessment/recheck distal pulses (given numbness to LLE). Possible redicussion with Dr. Kelsey if significant findings ([phone]). Updated pt earlier regarding tentative plan. Ramya - MRI findings as follows:  1. The patient is status post left-sided laminectomy at the L5-S1 level.  2. There is a left paramedian disc herniation at the L5-S1 level causing marked compression on the anterior left thecal sac and extending into the foramen on the left side likely causing nerve root compression. Postcontrast images demonstrate extensive pathological enhancement within the central canal particularly posteriorly on the left side adjacent to the laminectomy defect and extending into the left paraspinal soft tissue including the muscle and extending into the deep soft tissue. Inflammatory tissue/phlegmon is cannot be excluded Ramya - MRI findings as follows:  1. The patient is status post left-sided laminectomy at the L5-S1 level.  2. There is a left paramedian disc herniation at the L5-S1 level causing marked compression on the anterior left thecal sac and extending into the foramen on the left side likely causing nerve root compression. Postcontrast images demonstrate extensive pathological enhancement within the central canal particularly posteriorly on the left side adjacent to the laminectomy defect and extending into the left paraspinal soft tissue including the muscle and extending into the deep soft tissue. Inflammatory tissue/phlegmon is cannot be excluded.    Discussed all results with on-call attending Dr. Kelsey. Reviewed Radiology read. Per Dr. Kelsey, these are expected post-surgical changes without emergent need for transfer to [place] at this time. Recommends close follow up in next 24hr. Ramya - Discussed results with pt as well as conversation with Dr. Kelsey. Pt is very upset regarding his care in the ED, states he has not received updates during his stay. He is unhappy with plan for close follow up and is requesting emergent transfer to [place]. Discussed that this is not indicated at this time and hospital must accept transfer in order for this to occur. Escalated discussion to Dr. Muhammad who offered pt [place] spine consult which he accepts. Ortho Spine paged for evaluation. Ramya - Discussed with ortho spine, no additional work up indicated. Would also recommend follow up with his surgeon. Communicated this to pt who is requesting dc. Reiterated that findings were discussed with on call physician from his surgeon's office as well as our ortho spine. He is ambulatory. Counseled on follow up in next 24-36 hours with his surgeon.

## 2021-08-30 NOTE — ED PROVIDER NOTE - CLINICAL SUMMARY MEDICAL DECISION MAKING FREE TEXT BOX
31 M [facility] gastric bypass, lumbar laminectomy 7/8/21 w/ Dr. Tariq p/w back pain x one week and LLE numbness x 3 hours.  on exam + lumbar midline ttp w/ surgical scar healed, 5/5 strength all ext, diminished sensation light tough LLE, distal pulses 2+, rectal tone intact.  recent spinal surgery w/ new LLE numbness, will obtain labs and MRI to assess for cord compression/infection/abscess.  attempted to contact pt surgeon without success

## 2021-08-30 NOTE — ED PROVIDER NOTE - PATIENT PORTAL LINK FT
You can access the FollowMyHealth Patient Portal offered by [facility] by registering at the following website: [email or web address]. By joining [employer]’s FollowMyHealth portal, you will also be able to view your health information using other applications (apps) compatible with our system.

## 2021-08-31 ENCOUNTER — INPATIENT (INPATIENT)
Facility: HOSPITAL | Age: 31
LOS: 5 days | Discharge: HOME | DRG: 460 | End: 2021-09-06
Attending: ORTHOPAEDIC SURGERY | Admitting: ORTHOPAEDIC SURGERY
Payer: COMMERCIAL

## 2021-08-31 VITALS — HEIGHT: 70 IN | WEIGHT: 285.06 LBS

## 2021-08-31 DIAGNOSIS — Z98.84 BARIATRIC SURGERY STATUS: Chronic | ICD-10-CM

## 2021-08-31 DIAGNOSIS — Z98.89 OTHER SPECIFIED POSTPROCEDURAL STATES: Chronic | ICD-10-CM

## 2021-08-31 DIAGNOSIS — M54.5 LOW BACK PAIN: ICD-10-CM

## 2021-08-31 DIAGNOSIS — M54.16 RADICULOPATHY, LUMBAR REGION: ICD-10-CM

## 2021-08-31 LAB
ALBUMIN SERPL ELPH-MCNC: 4.2 G/DL — SIGNIFICANT CHANGE UP (ref 3.3–5)
ALP SERPL-CCNC: 58 U/L — SIGNIFICANT CHANGE UP (ref 40–120)
ALT FLD-CCNC: 45 U/L — SIGNIFICANT CHANGE UP (ref 12–78)
ANION GAP SERPL CALC-SCNC: 7 MMOL/L — SIGNIFICANT CHANGE UP (ref 5–17)
APTT BLD: 32.1 SEC — SIGNIFICANT CHANGE UP (ref 27.5–35.5)
AST SERPL-CCNC: 29 U/L — SIGNIFICANT CHANGE UP (ref 15–37)
BASOPHILS # BLD AUTO: 0.04 K/UL — SIGNIFICANT CHANGE UP (ref 0–0.2)
BASOPHILS NFR BLD AUTO: 0.5 % — SIGNIFICANT CHANGE UP (ref 0–2)
BILIRUB SERPL-MCNC: 0.7 MG/DL — SIGNIFICANT CHANGE UP (ref 0.2–1.2)
BLD GP AB SCN SERPL QL: SIGNIFICANT CHANGE UP
BUN SERPL-MCNC: 13 MG/DL — SIGNIFICANT CHANGE UP (ref 7–23)
CALCIUM SERPL-MCNC: 9.3 MG/DL — SIGNIFICANT CHANGE UP (ref 8.5–10.1)
CHLORIDE SERPL-SCNC: 109 MMOL/L — HIGH (ref 96–108)
CO2 SERPL-SCNC: 24 MMOL/L — SIGNIFICANT CHANGE UP (ref 22–31)
CREAT SERPL-MCNC: 0.95 MG/DL — SIGNIFICANT CHANGE UP (ref 0.5–1.3)
EOSINOPHIL # BLD AUTO: 0.23 K/UL — SIGNIFICANT CHANGE UP (ref 0–0.5)
EOSINOPHIL NFR BLD AUTO: 2.7 % — SIGNIFICANT CHANGE UP (ref 0–6)
GLUCOSE SERPL-MCNC: 81 MG/DL — SIGNIFICANT CHANGE UP (ref 70–99)
HCT VFR BLD CALC: 42.5 % — SIGNIFICANT CHANGE UP (ref 39–50)
HGB BLD-MCNC: 14.9 G/DL — SIGNIFICANT CHANGE UP (ref 13–17)
IMM GRANULOCYTES NFR BLD AUTO: 0.2 % — SIGNIFICANT CHANGE UP (ref 0–1.5)
INR BLD: 1.16 RATIO — SIGNIFICANT CHANGE UP (ref 0.88–1.16)
LYMPHOCYTES # BLD AUTO: 2.41 K/UL — SIGNIFICANT CHANGE UP (ref 1–3.3)
LYMPHOCYTES # BLD AUTO: 28.6 % — SIGNIFICANT CHANGE UP (ref 13–44)
MCHC RBC-ENTMCNC: 29.9 PG — SIGNIFICANT CHANGE UP (ref 27–34)
MCHC RBC-ENTMCNC: 35.1 GM/DL — SIGNIFICANT CHANGE UP (ref 32–36)
MCV RBC AUTO: 85.3 FL — SIGNIFICANT CHANGE UP (ref 80–100)
MONOCYTES # BLD AUTO: 0.64 K/UL — SIGNIFICANT CHANGE UP (ref 0–0.9)
MONOCYTES NFR BLD AUTO: 7.6 % — SIGNIFICANT CHANGE UP (ref 2–14)
NEUTROPHILS # BLD AUTO: 5.09 K/UL — SIGNIFICANT CHANGE UP (ref 1.8–7.4)
NEUTROPHILS NFR BLD AUTO: 60.4 % — SIGNIFICANT CHANGE UP (ref 43–77)
PLATELET # BLD AUTO: 242 K/UL — SIGNIFICANT CHANGE UP (ref 150–400)
POTASSIUM SERPL-MCNC: 3.9 MMOL/L — SIGNIFICANT CHANGE UP (ref 3.5–5.3)
POTASSIUM SERPL-SCNC: 3.9 MMOL/L — SIGNIFICANT CHANGE UP (ref 3.5–5.3)
PROT SERPL-MCNC: 7.7 GM/DL — SIGNIFICANT CHANGE UP (ref 6–8.3)
PROTHROM AB SERPL-ACNC: 13.5 SEC — SIGNIFICANT CHANGE UP (ref 10.6–13.6)
RBC # BLD: 4.98 M/UL — SIGNIFICANT CHANGE UP (ref 4.2–5.8)
RBC # FLD: 12.7 % — SIGNIFICANT CHANGE UP (ref 10.3–14.5)
SARS-COV-2 RNA SPEC QL NAA+PROBE: SIGNIFICANT CHANGE UP
SODIUM SERPL-SCNC: 140 MMOL/L — SIGNIFICANT CHANGE UP (ref 135–145)
WBC # BLD: 8.43 K/UL — SIGNIFICANT CHANGE UP (ref 3.8–10.5)
WBC # FLD AUTO: 8.43 K/UL — SIGNIFICANT CHANGE UP (ref 3.8–10.5)

## 2021-08-31 PROCEDURE — 86769 SARS-COV-2 COVID-19 ANTIBODY: CPT

## 2021-08-31 PROCEDURE — 93010 ELECTROCARDIOGRAM REPORT: CPT

## 2021-08-31 PROCEDURE — 85027 COMPLETE CBC AUTOMATED: CPT

## 2021-08-31 PROCEDURE — 99221 1ST HOSP IP/OBS SF/LOW 40: CPT

## 2021-08-31 PROCEDURE — C1713: CPT

## 2021-08-31 PROCEDURE — 97530 THERAPEUTIC ACTIVITIES: CPT | Mod: GP

## 2021-08-31 PROCEDURE — 82962 GLUCOSE BLOOD TEST: CPT

## 2021-08-31 PROCEDURE — C1889: CPT

## 2021-08-31 PROCEDURE — 97116 GAIT TRAINING THERAPY: CPT | Mod: GP

## 2021-08-31 PROCEDURE — 97162 PT EVAL MOD COMPLEX 30 MIN: CPT | Mod: GP

## 2021-08-31 PROCEDURE — 85610 PROTHROMBIN TIME: CPT

## 2021-08-31 PROCEDURE — 86900 BLOOD TYPING SEROLOGIC ABO: CPT

## 2021-08-31 PROCEDURE — 86901 BLOOD TYPING SEROLOGIC RH(D): CPT

## 2021-08-31 PROCEDURE — 85652 RBC SED RATE AUTOMATED: CPT

## 2021-08-31 PROCEDURE — 86850 RBC ANTIBODY SCREEN: CPT

## 2021-08-31 PROCEDURE — 72100 X-RAY EXAM L-S SPINE 2/3 VWS: CPT

## 2021-08-31 PROCEDURE — 76000 FLUOROSCOPY <1 HR PHYS/QHP: CPT

## 2021-08-31 PROCEDURE — 88304 TISSUE EXAM BY PATHOLOGIST: CPT

## 2021-08-31 PROCEDURE — 71046 X-RAY EXAM CHEST 2 VIEWS: CPT | Mod: 26

## 2021-08-31 PROCEDURE — 85025 COMPLETE CBC W/AUTO DIFF WBC: CPT

## 2021-08-31 PROCEDURE — 99285 EMERGENCY DEPT VISIT HI MDM: CPT

## 2021-08-31 PROCEDURE — 85730 THROMBOPLASTIN TIME PARTIAL: CPT

## 2021-08-31 PROCEDURE — U0003: CPT

## 2021-08-31 PROCEDURE — 71046 X-RAY EXAM CHEST 2 VIEWS: CPT

## 2021-08-31 PROCEDURE — 80048 BASIC METABOLIC PNL TOTAL CA: CPT

## 2021-08-31 PROCEDURE — 36415 COLL VENOUS BLD VENIPUNCTURE: CPT

## 2021-08-31 PROCEDURE — U0005: CPT

## 2021-08-31 PROCEDURE — 80053 COMPREHEN METABOLIC PANEL: CPT

## 2021-08-31 PROCEDURE — 72100 X-RAY EXAM L-S SPINE 2/3 VWS: CPT | Mod: 26

## 2021-08-31 RX ORDER — HYDROMORPHONE HYDROCHLORIDE 2 MG/ML
0.5 INJECTION INTRAMUSCULAR; INTRAVENOUS; SUBCUTANEOUS EVERY 4 HOURS
Refills: 0 | Status: DISCONTINUED | OUTPATIENT
Start: 2021-08-31 | End: 2021-09-06

## 2021-08-31 RX ORDER — ONDANSETRON 8 MG/1
4 TABLET, FILM COATED ORAL EVERY 6 HOURS
Refills: 0 | Status: DISCONTINUED | OUTPATIENT
Start: 2021-08-31 | End: 2021-09-06

## 2021-08-31 RX ORDER — HEPARIN SODIUM 5000 [USP'U]/ML
5000 INJECTION INTRAVENOUS; SUBCUTANEOUS EVERY 12 HOURS
Refills: 0 | Status: COMPLETED | OUTPATIENT
Start: 2021-08-31 | End: 2021-08-31

## 2021-08-31 RX ORDER — OXYCODONE HYDROCHLORIDE 5 MG/1
5 TABLET ORAL EVERY 4 HOURS
Refills: 0 | Status: DISCONTINUED | OUTPATIENT
Start: 2021-08-31 | End: 2021-09-02

## 2021-08-31 RX ORDER — BUPROPION HYDROCHLORIDE 150 MG/1
150 TABLET, EXTENDED RELEASE ORAL DAILY
Refills: 0 | Status: DISCONTINUED | OUTPATIENT
Start: 2021-09-01 | End: 2021-09-06

## 2021-08-31 RX ORDER — ACETAMINOPHEN 500 MG
975 TABLET ORAL EVERY 6 HOURS
Refills: 0 | Status: DISCONTINUED | OUTPATIENT
Start: 2021-08-31 | End: 2021-09-06

## 2021-08-31 RX ORDER — GABAPENTIN 400 MG/1
300 CAPSULE ORAL EVERY 8 HOURS
Refills: 0 | Status: DISCONTINUED | OUTPATIENT
Start: 2021-08-31 | End: 2021-09-06

## 2021-08-31 RX ORDER — FLUOXETINE HCL 10 MG
20 CAPSULE ORAL DAILY
Refills: 0 | Status: DISCONTINUED | OUTPATIENT
Start: 2021-09-01 | End: 2021-09-06

## 2021-08-31 RX ORDER — OXYCODONE HYDROCHLORIDE 5 MG/1
10 TABLET ORAL EVERY 4 HOURS
Refills: 0 | Status: DISCONTINUED | OUTPATIENT
Start: 2021-08-31 | End: 2021-09-02

## 2021-08-31 RX ORDER — HEPARIN SODIUM 5000 [USP'U]/ML
5000 INJECTION INTRAVENOUS; SUBCUTANEOUS EVERY 8 HOURS
Refills: 0 | Status: DISCONTINUED | OUTPATIENT
Start: 2021-08-31 | End: 2021-08-31

## 2021-08-31 RX ADMIN — HYDROMORPHONE HYDROCHLORIDE 0.5 MILLIGRAM(S): 2 INJECTION INTRAMUSCULAR; INTRAVENOUS; SUBCUTANEOUS at 21:24

## 2021-08-31 RX ADMIN — HEPARIN SODIUM 5000 UNIT(S): 5000 INJECTION INTRAVENOUS; SUBCUTANEOUS at 19:30

## 2021-08-31 RX ADMIN — OXYCODONE HYDROCHLORIDE 10 MILLIGRAM(S): 5 TABLET ORAL at 17:20

## 2021-08-31 RX ADMIN — OXYCODONE HYDROCHLORIDE 10 MILLIGRAM(S): 5 TABLET ORAL at 23:40

## 2021-08-31 RX ADMIN — GABAPENTIN 300 MILLIGRAM(S): 400 CAPSULE ORAL at 21:24

## 2021-08-31 NOTE — H&P ADULT - ASSESSMENT
31M with new L5-S1 disc herniation with LLE radiculopathy and weakness and intractable back pain.  -Admit to Dr Tariq  -Analgesia  -Labs, ESR  -Xray  -Review   -Reg Diet  -DVT PE ppx  -OOB as chandrika  -Gabapentin  -Plan for OR Thursday  -Above as directed by Dr Tariq

## 2021-08-31 NOTE — ED ADULT TRIAGE NOTE - CHIEF COMPLAINT QUOTE
patient ambulatory to ED, sent y MD Tariq for admission for discectomy. patient repots h/o L5-S1 discectomy on 7/8, went to unplug his laptop last night and felt a stabbing pain in his back that radiated down the L. leg with numbness

## 2021-08-31 NOTE — H&P ADULT - NSHPLABSRESULTS_GEN_ALL_CORE
ns Last 24 Hrs  T(C): 37.1 (31 Aug 2021 15:25), Max: 37.1 (30 Aug 2021 21:10)  T(F): 98.8 (31 Aug 2021 15:25), Max: 98.8 (30 Aug 2021 21:10)  HR: 80 (30 Aug 2021 21:10) (80 - 80)  BP: 152/95 (31 Aug 2021 15:25) (152/95 - 159/90)  BP(mean): 111 (31 Aug 2021 15:25) (111 - 111)  RR: 19 (31 Aug 2021 15:25) (18 - 19)  SpO2: 98% (31 Aug 2021 15:25) (98% - 100%)  I&O's Detail      LABS:                        14.9   8.43  )-----------( 242      ( 31 Aug 2021 16:07 )             42.5     08-30    136  |  104  |  10  ----------------------------<  84  see note   |  20<L>  |  0.89    Ca    9.4      30 Aug 2021 17:01    TPro  7.1  /  Alb  4.1  /  TBili  0.5  /  DBili  x   /  AST  see  note moderate hemolysis  /  ALT  see note  /  AlkPhos  50  08-30

## 2021-08-31 NOTE — H&P ADULT - NSHPPHYSICALEXAM_GEN_ALL_CORE
PHYSICAL EXAM:  General; Awake and alert, Oriented x 3, calm and ambulatory, pleasant. Too uncomfortable to sit down at this time prefers to stand.  Head: NCAT  Neck: Supple  Pulse: Regular  Lungs: Breathing is nonlabored  ABd: Soft NT  LE: No edema  Lumbar Spine exam:  Back: Mildly tender over Left lumbar area  Extremities:              Sensation: decreased through LLE dermatomes thigh and lower leg as well as foot dorsum         Motor exam:       Bilateral Lower ext: EHL and TA weak about 3/5. FHL, TA intact. Calf no TTP

## 2021-08-31 NOTE — ED STATDOCS - PROGRESS NOTE DETAILS
32 yo male with a PMH of anxiety, ADHD, s/p micro diskectomy of L5-S1 on 7/8 presents with back pain last night. Pt was sitting, went to unplug his labtop and felt a sharp pain in his back. Was seen at [facility] who d/c him home on percocet. Was seen by Dr. Tariq today who told him to come to the ER to be admitted for surgery.

## 2021-08-31 NOTE — ED ADULT NURSE REASSESSMENT NOTE - NS ED NURSE REASSESS COMMENT FT1
received pt from previous rn jacob. pt resting in stretcher/ vss. no complaints at this time. will medicate outstanding orders from previous shift. will ctm

## 2021-08-31 NOTE — ED STATDOCS - ATTENDING CONTRIBUTION TO CARE
I, Nahun Abbott DO, personally saw the patient with ACP.  I have personally performed a face to face diagnostic evaluation on this patient and formulated the patient plan. The case was discussed with, and handed off to ACP who followed the case through to the re-evaluation and disposition.

## 2021-08-31 NOTE — ED STATDOCS - PHYSICAL EXAMINATION
Vital signs as available reviewed.  General:  Comfortable, no acute distress.  Head:  Normocephalic, atraumatic.  Eyes:  Conjunctiva pink, no icterus.  Cardiovascular:  Regular rate, no obvious murmur.  Respiratory:  Clear to auscultation, good air entry bilaterally.  Abdomen:  Soft, non-tender.  Musculoskeletal:  No deformity or calf tenderness.  Neurologic: Alert and oriented, moving all extremities. Decreased sensation LLE.   Skin:  Warm and dry.

## 2021-08-31 NOTE — H&P ADULT - HISTORY OF PRESENT ILLNESS
CHIEF COMPLAINT:     HPI: Pt is a 31y s/p partial laminectomy back in July w Dr Tariq now with new back pain and LLE radiculopathy. Pt was doing very well up until yesterday. He works for [employer] and is a . While on duty yesterday in the city he turned while seated and leaned over to grab his laptop and felt a "pop" in his back with a stabbing sensation followed by numbness down the left thigh and lower leg. He now also has weakness in dorsiflexion. He was seen at [facility] ED wher ehe had an MRI. Pt requested trasfer here but was refused by [facility] and DC'd home on percocet. He followed up w DR Tariq today in the office where he was seen and examined. He is sent in by DR Tariq for intractable back pain with radiculopathy and plan for surgery this Thursday. denies incontinence of bowel or bladder. He has been able to ambulate without any devices. He recently completed a Medrol dose pack 2 weeks ago.    PAST MEDICAL & SURGICAL HISTORY:  IBS (irritable bowel syndrome)    Sleep apnea    Seasonal allergies    Bronchitis  annual    Obese    Lumbar herniated disc  L5 S1 left    ADHD    Anxiety and depression    2019 novel coronavirus disease (COVID-19)  3/2020    GERD (gastroesophageal reflux disease)    Renal calculi    COVID-19 vaccine series completed  Pfizer 1/2021    S/P tonsillectomy and adenoidectomy    S/P laparoscopic sleeve gastrectomy  12/2018        FAMILY HISTORY:  Family history of coronary artery disease (Father)    Family history of diabetes mellitus (Father)  father with dm, cad and hyperthyroid    Family history of hyperthyroidism    Family history of hypertension (Mother)  mom with htn        SOCIAL HISTORY: per HPI

## 2021-08-31 NOTE — ED STATDOCS - OBJECTIVE STATEMENT
30 y/o male with a PMHx of ADHD, anxiety, depression, bronchitis, GERD, IBS, lumbar herniated disc, obese, renal calculi, seasonal allergies, sleep apnea presents ambulatory to the ED sent by Dr. Tariq for admission. Pt s/p L5-S1 discectomy on 7/8, went to unplug his laptop last night and felt a stabbing pain in his back that radiated down LLE with numbness. Denies fevers, chills, n/v/d, CP, SOB. No other complaints at this time.

## 2021-08-31 NOTE — ED STATDOCS - NS ED ROS FT
Constitutional: No fever.  Neurological: No headache. +LLE numbness   Eyes: No vision changes.   Ears, Nose, Mouth, Throat: No congestion.  Cardiovascular: No chest pain.  Respiratory: No difficulty breathing.  Gastrointestinal: No nausea or vomiting.  Genitourinary: No dysuria.  Musculoskeletal: No joint pain. +back pain   Integumentary (skin and/or breast): No rash.

## 2021-09-01 LAB
ANION GAP SERPL CALC-SCNC: 1 MMOL/L — LOW (ref 5–17)
APTT BLD: 32.6 SEC — SIGNIFICANT CHANGE UP (ref 27.5–35.5)
BUN SERPL-MCNC: 14 MG/DL — SIGNIFICANT CHANGE UP (ref 7–23)
CALCIUM SERPL-MCNC: 8.8 MG/DL — SIGNIFICANT CHANGE UP (ref 8.5–10.1)
CHLORIDE SERPL-SCNC: 109 MMOL/L — HIGH (ref 96–108)
CO2 SERPL-SCNC: 29 MMOL/L — SIGNIFICANT CHANGE UP (ref 22–31)
COVID-19 SPIKE DOMAIN AB INTERP: POSITIVE
COVID-19 SPIKE DOMAIN ANTIBODY RESULT: >250 U/ML — HIGH
CREAT SERPL-MCNC: 0.94 MG/DL — SIGNIFICANT CHANGE UP (ref 0.5–1.3)
CRP SERPL-MCNC: <3 MG/L — SIGNIFICANT CHANGE UP
GLUCOSE SERPL-MCNC: 77 MG/DL — SIGNIFICANT CHANGE UP (ref 70–99)
HCT VFR BLD CALC: 40.2 % — SIGNIFICANT CHANGE UP (ref 39–50)
HGB BLD-MCNC: 13.6 G/DL — SIGNIFICANT CHANGE UP (ref 13–17)
INR BLD: 1.13 RATIO — SIGNIFICANT CHANGE UP (ref 0.88–1.16)
MCHC RBC-ENTMCNC: 29.6 PG — SIGNIFICANT CHANGE UP (ref 27–34)
MCHC RBC-ENTMCNC: 33.8 GM/DL — SIGNIFICANT CHANGE UP (ref 32–36)
MCV RBC AUTO: 87.6 FL — SIGNIFICANT CHANGE UP (ref 80–100)
PLATELET # BLD AUTO: 210 K/UL — SIGNIFICANT CHANGE UP (ref 150–400)
POTASSIUM SERPL-MCNC: 4.1 MMOL/L — SIGNIFICANT CHANGE UP (ref 3.5–5.3)
POTASSIUM SERPL-SCNC: 4.1 MMOL/L — SIGNIFICANT CHANGE UP (ref 3.5–5.3)
PROTHROM AB SERPL-ACNC: 13 SEC — SIGNIFICANT CHANGE UP (ref 10.6–13.6)
RBC # BLD: 4.59 M/UL — SIGNIFICANT CHANGE UP (ref 4.2–5.8)
RBC # FLD: 12.8 % — SIGNIFICANT CHANGE UP (ref 10.3–14.5)
SARS-COV-2 IGG+IGM SERPL QL IA: >250 U/ML — HIGH
SARS-COV-2 IGG+IGM SERPL QL IA: POSITIVE
SODIUM SERPL-SCNC: 139 MMOL/L — SIGNIFICANT CHANGE UP (ref 135–145)
WBC # BLD: 7.85 K/UL — SIGNIFICANT CHANGE UP (ref 3.8–10.5)
WBC # FLD AUTO: 7.85 K/UL — SIGNIFICANT CHANGE UP (ref 3.8–10.5)

## 2021-09-01 PROCEDURE — 99232 SBSQ HOSP IP/OBS MODERATE 35: CPT

## 2021-09-01 RX ORDER — PANTOPRAZOLE SODIUM 20 MG/1
40 TABLET, DELAYED RELEASE ORAL
Refills: 0 | Status: DISCONTINUED | OUTPATIENT
Start: 2021-09-01 | End: 2021-09-06

## 2021-09-01 RX ORDER — SODIUM CHLORIDE 9 MG/ML
1000 INJECTION, SOLUTION INTRAVENOUS
Refills: 0 | Status: DISCONTINUED | OUTPATIENT
Start: 2021-09-01 | End: 2021-09-03

## 2021-09-01 RX ORDER — DIPHENHYDRAMINE HCL 50 MG
25 CAPSULE ORAL ONCE
Refills: 0 | Status: COMPLETED | OUTPATIENT
Start: 2021-09-01 | End: 2021-09-01

## 2021-09-01 RX ORDER — DIPHENHYDRAMINE HCL 50 MG
25 CAPSULE ORAL EVERY 4 HOURS
Refills: 0 | Status: DISCONTINUED | OUTPATIENT
Start: 2021-09-01 | End: 2021-09-06

## 2021-09-01 RX ADMIN — Medication 25 MILLIGRAM(S): at 00:23

## 2021-09-01 RX ADMIN — OXYCODONE HYDROCHLORIDE 10 MILLIGRAM(S): 5 TABLET ORAL at 16:50

## 2021-09-01 RX ADMIN — HYDROMORPHONE HYDROCHLORIDE 0.5 MILLIGRAM(S): 2 INJECTION INTRAMUSCULAR; INTRAVENOUS; SUBCUTANEOUS at 18:10

## 2021-09-01 RX ADMIN — OXYCODONE HYDROCHLORIDE 10 MILLIGRAM(S): 5 TABLET ORAL at 10:40

## 2021-09-01 RX ADMIN — Medication 20 MILLIGRAM(S): at 09:45

## 2021-09-01 RX ADMIN — Medication 25 MILLIGRAM(S): at 22:39

## 2021-09-01 RX ADMIN — HYDROMORPHONE HYDROCHLORIDE 0.5 MILLIGRAM(S): 2 INJECTION INTRAMUSCULAR; INTRAVENOUS; SUBCUTANEOUS at 17:45

## 2021-09-01 RX ADMIN — ONDANSETRON 4 MILLIGRAM(S): 8 TABLET, FILM COATED ORAL at 10:48

## 2021-09-01 RX ADMIN — SODIUM CHLORIDE 75 MILLILITER(S): 9 INJECTION, SOLUTION INTRAVENOUS at 23:12

## 2021-09-01 RX ADMIN — HYDROMORPHONE HYDROCHLORIDE 0.5 MILLIGRAM(S): 2 INJECTION INTRAMUSCULAR; INTRAVENOUS; SUBCUTANEOUS at 22:39

## 2021-09-01 RX ADMIN — PANTOPRAZOLE SODIUM 40 MILLIGRAM(S): 20 TABLET, DELAYED RELEASE ORAL at 09:45

## 2021-09-01 RX ADMIN — HYDROMORPHONE HYDROCHLORIDE 0.5 MILLIGRAM(S): 2 INJECTION INTRAMUSCULAR; INTRAVENOUS; SUBCUTANEOUS at 13:30

## 2021-09-01 RX ADMIN — GABAPENTIN 300 MILLIGRAM(S): 400 CAPSULE ORAL at 05:43

## 2021-09-01 RX ADMIN — HYDROMORPHONE HYDROCHLORIDE 0.5 MILLIGRAM(S): 2 INJECTION INTRAMUSCULAR; INTRAVENOUS; SUBCUTANEOUS at 23:09

## 2021-09-01 RX ADMIN — OXYCODONE HYDROCHLORIDE 10 MILLIGRAM(S): 5 TABLET ORAL at 20:18

## 2021-09-01 RX ADMIN — GABAPENTIN 300 MILLIGRAM(S): 400 CAPSULE ORAL at 21:56

## 2021-09-01 RX ADMIN — HYDROMORPHONE HYDROCHLORIDE 0.5 MILLIGRAM(S): 2 INJECTION INTRAMUSCULAR; INTRAVENOUS; SUBCUTANEOUS at 13:08

## 2021-09-01 RX ADMIN — GABAPENTIN 300 MILLIGRAM(S): 400 CAPSULE ORAL at 13:08

## 2021-09-01 RX ADMIN — BUPROPION HYDROCHLORIDE 150 MILLIGRAM(S): 150 TABLET, EXTENDED RELEASE ORAL at 09:45

## 2021-09-01 RX ADMIN — OXYCODONE HYDROCHLORIDE 10 MILLIGRAM(S): 5 TABLET ORAL at 16:00

## 2021-09-01 RX ADMIN — OXYCODONE HYDROCHLORIDE 10 MILLIGRAM(S): 5 TABLET ORAL at 00:10

## 2021-09-01 RX ADMIN — OXYCODONE HYDROCHLORIDE 10 MILLIGRAM(S): 5 TABLET ORAL at 20:48

## 2021-09-01 RX ADMIN — OXYCODONE HYDROCHLORIDE 10 MILLIGRAM(S): 5 TABLET ORAL at 09:45

## 2021-09-01 RX ADMIN — OXYCODONE HYDROCHLORIDE 10 MILLIGRAM(S): 5 TABLET ORAL at 05:43

## 2021-09-01 NOTE — CONSULT NOTE ADULT - SUBJECTIVE AND OBJECTIVE BOX
32 y/o M w/ PMH of sleep apnea (resolved since weight loss), GERD, h/o partial laminectomy, p/w back pain and LLE radiculopathy. Hospitalist consulted for pre-op risk stratification prior to surgery. Patient states that he developed sudden onset back pain yesterday and LLE numbness. States he has back pain when he ambulates, and it improves with rest. Denies urinary / fecal incontinence / saddle anesthesia. Denies CP, SOB, cough, runny nose, sore throat, nausea, vomiting, abdominal pain, fever, chills. Denies prior hx or CVA / DM2 / CKD / CHF. Denies any previous complications related to surgery / anesthesia     PSH: Tonsillectomy, adenoidectomy, laparoscopic sleeve gastrectomy    Social hx: Denies x 3    Family Hx: Father - CAD / DM, Mother - HTN

## 2021-09-01 NOTE — PROGRESS NOTE ADULT - SUBJECTIVE AND OBJECTIVE BOX
Pt seen resting in bed comfortably and states he feels his pain is controlled compared to yesterday 2/2 meds. Pt has constant low back pain. Pain radiates to left buttock, left hamstring, left ant tib to the dorsal aspect of left foot with numbness and weakness. Medications are helping with the pain. Pt denies bowel and bladder dysfunction.     PE  Gen appearance: NAD  motor strength: 4/5 of the left EHL, 4/5 of the left ant tib and left gastroc, 4/5 of the left HF and left quad. 5/5 of the right quad, HF, ant tib, gastrocs, and EHL.  Sensation: numbness of the left quad, left ant tib, dorsal aspect of left foot.  tenderness to palpation of the left buttock and left paraspinal region of the lumbar region.    Plan  VSS, afeb  NPO after midnight. Scheduled tentatively for surgery tomorrow.   WBC: wnl, H/H: wnl, PT: wnl, INR: wnl  Type and screen done  Appreciate medicine note: Patient is low risk for major cardiac complications in perioperative for intermediate risk surgery  Discussed case with Dr. Tariq.

## 2021-09-01 NOTE — PROGRESS NOTE ADULT - SUBJECTIVE AND OBJECTIVE BOX
Orthopedics     Pt seen and examined at the bedside. Pain is well controlled at this time. Pt reports feeling well, , no concerns at this time.     Vital Signs Last 24 Hrs  T(C): 36.6 (08-31-21 @ 23:56), Max: 37.1 (08-31-21 @ 15:25)  T(F): 97.9 (08-31-21 @ 23:56), Max: 98.8 (08-31-21 @ 15:25)  HR: 77 (08-31-21 @ 23:56) (74 - 99)  BP: 138/82 (08-31-21 @ 23:56) (119/67 - 153/74)  BP(mean): 80 (08-31-21 @ 23:14) (80 - 111)  RR: 16 (08-31-21 @ 23:56) (16 - 19)  SpO2: 100% (08-31-21 @ 23:56) (98% - 100%)                        14.9   8.43  )-----------( 242      ( 31 Aug 2021 16:07 )             42.5     31 Aug 2021 16:07    140    |  109    |  13     ----------------------------<  81     3.9     |  24     |  0.95     Ca    9.3        31 Aug 2021 16:07    TPro  7.7    /  Alb  4.2    /  TBili  0.7    /  DBili  x      /  AST  29     /  ALT  45     /  AlkPhos  58     31 Aug 2021 16:07    PT/INR - ( 31 Aug 2021 16:07 )   PT: 13.5 sec;   INR: 1.16 ratio         PTT - ( 31 Aug 2021 16:07 )  PTT:32.1 sec    PHYSICAL EXAM  GEN: NAD, AAOx3    SPINE:  Skin intact  No bony step-offs   Grossly moving all extremities  + Median/Radial/Ulnar/Musculocutaneous/Axillary nerves intact  + Hip Flexors/Quads/Hamstrings/TA/EHL/FHL/GS  SILT C5-T1  SILT L2-S1  + Radial Pulse  + DP/PT Pulses  No saddle anesthesia    Motor:                          Deltoid       Biceps      Triceps      Wrist Ext      Finger Flex    Finger Abduction   RIGHT             5/5             5/5             5/5             5/5                 5/5                     5/5  LEFT                5/5             5/5             5/5             5/5                 5/5                     5/5                          Hip Flex       Knee Ext          Dorsiflex      Hallux Ext        PlantarFlex  RIGHT            5/5                5/5                 3/5               3/5                 5/5                LEFT               5/5                5/5                 3/5               3/5                 5/5                      Sensory:                      C5      C6      C7      C8       T1          RIGHT          2         2        2         2         2          (0=absent, 1=impaired, 2=normal, NT=not testable)  LEFT             2         2        2         2         2          (0=absent, 1=impaired, 2=normal, NT=not testable)                        L2        L3       L4      L5       S1          RIGHT        2          2         2        2        2           (0=absent, 1=impaired, 2=normal, NT=not testable)  LEFT           2          2         1        1        2           (0=absent, 1=impaired, 2=normal, NT=not testable)    Negative Martino's sign bilaterally  Negative Babinski bilaterally   Negative Myoclonus bilaterally      A/P:  31y w/ Recurrent HNP L5-S1 s/p Lami/Disc L5-S1 7/8/21 w/ Dr Tariq    Plan:    -Plan for surgical intervention 9/1 if one of Dr fraga cases cancels, otherwise will plan for 9/2  -Preop labs/imaging: CBC/BMP/PT/PTT/INR/T&S/Covid/CXR/EKG.  -NPO/IVFs while NPO.  -WBAT  -Hold DVT ppx  -Pain control prn  -Medical management, continue home meds, Medical optimization documented  -Case discussed with attending, will advise if plan changes.

## 2021-09-01 NOTE — PROGRESS NOTE ADULT - SUBJECTIVE AND OBJECTIVE BOX
Patient seen and examined Chart reviewed    Patient known to me from prior elective microdiscectomy in July for large L5-S1 HNP  Did well post op until recently  Had acute severe onset of recurrent Left sided leg pain, weakness, numbness  Presented to [facility] ED 2 days ago with severe pain  MRI demonstrated a large recurrent HNP L5-S1    He was admitted back through the ED at  for intractable sciatica and radiculopathy    He is indicated for revision discectomy and decompression  He in all likelihood require definitive discectomy and fusion due to severe disc lost and instability and recurrent HNP    A less invasive approach is requested    We discussed the risks and benefits for a revision surgery as well a no-op options  Due to progressive recurrent symptoms - surgery is indicated  Risk include adjacent level disease, persistent back and or leg pain  Nerve deficits, hardware failure , pseudoarthrosis, CSF leak, infection, hematoma, bleeding requiring transfusion.    He wishes to proceed as indicated  All questions asked and answered especially with respect to down time and time out of work    ALEJANDRA Tariq MD

## 2021-09-01 NOTE — CONSULT NOTE ADULT - ASSESSMENT
30 y/o M w/ PMH of sleep apnea (resolved since weight loss), GERD, h/o partial laminectomy, p/w back pain and LLE radiculopathy    *Back pain w/ Scheduled for OR in AM for revision of lumbar decompression  -Patient is low risk for major cardiac complications in perioperative for intermediate risk surgery   -EKG   -Management as per ortho     *H/o sleep apnea  -Patient states sleep apnea resolved after weight loss    *GERD  -C/w home meds    *DVT ppx  -SCDs as patient is going to OR in AM    30 y/o M w/ PMH of sleep apnea (resolved since weight loss), GERD, h/o partial laminectomy, p/w back pain and LLE radiculopathy    *Back pain w/ Scheduled for OR in AM for revision of lumbar decompression  -Patient is low risk for major cardiac complications in perioperative for intermediate risk surgery   -EKG   -Management as per ortho     *H/o sleep apnea  -Patient states sleep apnea resolved after weight loss    *GERD  -C/w home meds    *DVT ppx  -SCDs as patient is going to OR in AM

## 2021-09-01 NOTE — PROGRESS NOTE ADULT - SUBJECTIVE AND OBJECTIVE BOX
30 y/o M w/ PMH of sleep apnea (resolved since weight loss), GERD, h/o partial laminectomy, p/w back pain and LLE radiculopathy.  Patient states that he developed sudden onset back pain yesterday and LLE numbness reaching for his phone . States he has back pain when he ambulates, and it improves with rest. Denies urinary / fecal incontinence / saddle anesthesia. Denies CP, SOB, cough, runny nose, sore throat, nausea, vomiting, abdominal pain, fever, chills. Denies prior hx or CVA / DM2 / CKD / CHF. Denies any previous complications related to surgery / anesthesia       9/1: seen at bedside and amb in hallway, presently comfortable,     ROS:  all 10 systems reviewed and are neg with pertinent positives documented in HPI      Vital Signs Last 24 Hrs  T(C): 36.4 (09-01-21 @ 09:41), Max: 37.1 (08-31-21 @ 15:25)  T(F): 97.5 (09-01-21 @ 09:41), Max: 98.8 (08-31-21 @ 15:25)  HR: 86 (09-01-21 @ 09:41) (74 - 99)  BP: 133/65 (09-01-21 @ 09:41) (119/67 - 153/74)  BP(mean): 79 (09-01-21 @ 09:41) (79 - 111)  RR: 16 (09-01-21 @ 09:41) (16 - 19)  SpO2: 100% (09-01-21 @ 09:41) (98% - 100%)      MEDICATIONS  (STANDING):  buPROPion XL (24-Hour) . 150 milliGRAM(s) Oral daily  FLUoxetine 20 milliGRAM(s) Oral daily  gabapentin 300 milliGRAM(s) Oral every 8 hours  pantoprazole    Tablet 40 milliGRAM(s) Oral before breakfast    MEDICATIONS  (PRN):  acetaminophen   Tablet .. 975 milliGRAM(s) Oral every 6 hours PRN Temp greater or equal to 38C (100.4F), Mild Pain (1 - 3)  HYDROmorphone  Injectable 0.5 milliGRAM(s) SubCutaneous every 4 hours PRN Severe Pain (7 - 10)  ondansetron Injectable 4 milliGRAM(s) IV Push every 6 hours PRN Nausea  oxyCODONE    IR 5 milliGRAM(s) Oral every 4 hours PRN Mild Pain (1 - 3)  oxyCODONE    IR 10 milliGRAM(s) Oral every 4 hours PRN Moderate Pain (4 - 6)    PHYSICAL EXAM:    GENERAL: Comfortable, no acute distress   HEAD:  Normocephalic, atraumatic  EYES: EOMI, PERRLA  HEENT: Moist mucous membranes  NECK: Supple, No JVD  NERVOUS SYSTEM:  Alert & Oriented X3, Motor Strength 5/5 B/L upper and lower extremities  CHEST/LUNG: Clear to auscultation bilaterally  HEART: Regular rate and rhythm  ABDOMEN: Soft, non tender, Nondistended, Bowel sounds present  GENITOURINARY: Voiding, no palpable bladder  EXTREMITIES:   No clubbing, cyanosis, or edema  MUSCULOSKELETAL- + low back pain with LLE radiculopathy  SKIN-no rash                            13.6   7.85  )-----------( 210      ( 01 Sep 2021 05:40 )             40.2       09-01    139  |  109<H>  |  14  ----------------------------<  77  4.1   |  29  |  0.94    Ca    8.8      01 Sep 2021 05:40    TPro  7.7  /  Alb  4.2  /  TBili  0.7  /  DBili  x   /  AST  29  /  ALT  45  /  AlkPhos  58  08-31      PT/INR - ( 01 Sep 2021 05:40 )   PT: 13.0 sec;   INR: 1.13 ratio         PTT - ( 01 Sep 2021 05:40 )  PTT:32.6 sec      IMP: 30 y/o M w/  above PMH a/w:    *Back pain w/ Scheduled for OR in AM for revision of lumbar decompression  -Patient is low risk for major cardiac complications in perioperative for intermediate risk surgery   -EKG   -admit to spine  NPO after midnight    *H/o sleep apnea  -Patient states sleep apnea resolved after weight loss    *GERD  -C/w home meds    *DVT ppx  -SCDs as patient is going to OR in AM    32 y/o M w/ PMH of sleep apnea (resolved since weight loss), GERD, h/o partial laminectomy, p/w back pain and LLE radiculopathy.  Patient states that he developed sudden onset back pain yesterday and LLE numbness reaching for his phone . States he has back pain when he ambulates, and it improves with rest. Denies urinary / fecal incontinence / saddle anesthesia. Denies CP, SOB, cough, runny nose, sore throat, nausea, vomiting, abdominal pain, fever, chills. Denies prior hx or CVA / DM2 / CKD / CHF. Denies any previous complications related to surgery / anesthesia       9/1: seen at bedside and amb in hallway, presently comfortable,     ROS:  all 10 systems reviewed and are neg with pertinent positives documented in HPI      Vital Signs Last 24 Hrs  T(C): 36.4 (09-01-21 @ 09:41), Max: 37.1 (08-31-21 @ 15:25)  T(F): 97.5 (09-01-21 @ 09:41), Max: 98.8 (08-31-21 @ 15:25)  HR: 86 (09-01-21 @ 09:41) (74 - 99)  BP: 133/65 (09-01-21 @ 09:41) (119/67 - 153/74)  BP(mean): 79 (09-01-21 @ 09:41) (79 - 111)  RR: 16 (09-01-21 @ 09:41) (16 - 19)  SpO2: 100% (09-01-21 @ 09:41) (98% - 100%)      MEDICATIONS  (STANDING):  buPROPion XL (24-Hour) . 150 milliGRAM(s) Oral daily  FLUoxetine 20 milliGRAM(s) Oral daily  gabapentin 300 milliGRAM(s) Oral every 8 hours  pantoprazole    Tablet 40 milliGRAM(s) Oral before breakfast    MEDICATIONS  (PRN):  acetaminophen   Tablet .. 975 milliGRAM(s) Oral every 6 hours PRN Temp greater or equal to 38C (100.4F), Mild Pain (1 - 3)  HYDROmorphone  Injectable 0.5 milliGRAM(s) SubCutaneous every 4 hours PRN Severe Pain (7 - 10)  ondansetron Injectable 4 milliGRAM(s) IV Push every 6 hours PRN Nausea  oxyCODONE    IR 5 milliGRAM(s) Oral every 4 hours PRN Mild Pain (1 - 3)  oxyCODONE    IR 10 milliGRAM(s) Oral every 4 hours PRN Moderate Pain (4 - 6)    PHYSICAL EXAM:    GENERAL: Comfortable, no acute distress   HEAD:  Normocephalic, atraumatic  EYES: EOMI, PERRLA  HEENT: Moist mucous membranes  NECK: Supple, No JVD  NERVOUS SYSTEM:  Alert & Oriented X3, Motor Strength 5/5 B/L upper and lower extremities  CHEST/LUNG: Clear to auscultation bilaterally  HEART: Regular rate and rhythm  ABDOMEN: Soft, non tender, Nondistended, Bowel sounds present  GENITOURINARY: Voiding, no palpable bladder  EXTREMITIES:   No clubbing, cyanosis, or edema  MUSCULOSKELETAL- + low back pain with LLE radiculopathy  SKIN-no rash    < from: Xray Chest 2 Views PA/Lat (08.31.21 @ 16:39) >    EXAM:  XR CHEST PA LAT 2V                            PROCEDURE DATE:  08/31/2021          INTERPRETATION:  Chest 2 views    HISTORY: Preop    COMPARISON: 7/1/2021    Frontal and lateral views of the chest were obtained with suboptimal inspiration. With allowance for this, the heart is normal in size and the lungs are clear. There is no evidence of pneumothorax nor pleural effusion.    IMPRESSION: No active pulmonary disease.          < from: Xray Lumbosacral Spine (08.31.21 @ 16:38) >    EXAM:  XR LS SPINE AP LAT 2-3 VIEWS                            PROCEDURE DATE:  08/31/2021          INTERPRETATION:  Lumbar spine:    HISTORY: Low back pain    COMPARISON: 8/23/2010    Three views of the lumbar spine reveal normal alignment and curvature in the lateral view. Mild levoscoliosis is seen in the frontal view.    The vertebral bodies are satisfactory in height with no evidence of recent or old compression fracture. The pedicles, spinous processes and transverse processes and sacroiliac joints with similar accessory SI joints appear normal. The disc spaces show mild narrowing of L5-S1.    IMPRESSION:  No acute bony pathology of the lumbosacral spine.    < from: MR Lumbar Spine w/wo IV Cont (08.30.21 @ 23:05) >  EXAM:  MR SPINE LUMBAR WAW IC                          PROCEDURE DATE:  08/30/2021          INTERPRETATION:  Trey ELIZONDO MD, have reviewed the images and the report and agree with the findings, with the following modifications:    IV contrast: 7.5 mL IV Gadavist.    Agree that the patient is status post left hemilaminectomy at L5-S1 with associated postsurgical changes. There is enhancement within the dorsal subcutaneous soft tissues and paraspinal muscles extending into the laminectomy bed without evidence of drainable fluid collection. Agree that there is enhancement within the left lateral common dorsal and ventral epidural space at the L5-S1 level which may be secondary to postsurgical changes although phlegmon/infection can have a similar appearance and clinical correlation is recommended. No evidence of drainable fluid collection within the epidural space.    Agree that there is a left lateral recess/foraminal disc herniation at L5-S1 with mass effect on the descending left S1 and exiting left L5 nerve roots.    Additional findings:    There is dextroscoliosis of the thoracolumbar junction. The vertebral body heights are maintained. There is trace retrolisthesis of L5 on S1. There is disc desiccation and disc space narrowing at L5-S1. The conus medullaris terminates at the L1-L2 level and is normal signal and morphology.    At L1-L2 there is a disc bulge without stenotic and spinal canal stenosis or neural foraminal narrowing.    At L4-L5 there is a disc bulge with facet hypertrophy with mild spinal canal stenosis and bilateral neural foraminal narrowing.                            13.6   7.85  )-----------( 210      ( 01 Sep 2021 05:40 )             40.2       09-01    139  |  109<H>  |  14  ----------------------------<  77  4.1   |  29  |  0.94    Ca    8.8      01 Sep 2021 05:40    TPro  7.7  /  Alb  4.2  /  TBili  0.7  /  DBili  x   /  AST  29  /  ALT  45  /  AlkPhos  58  08-31      PT/INR - ( 01 Sep 2021 05:40 )   PT: 13.0 sec;   INR: 1.13 ratio         PTT - ( 01 Sep 2021 05:40 )  PTT:32.6 sec      IMP: 32 y/o M w/  above PMH a/w:    *Back pain w/ Scheduled for OR in AM for revision of lumbar decompression  -Patient is low risk for major cardiac complications in perioperative for intermediate risk surgery   -EKG reviewed by me: NSR, no ectopy  -admit to spine  NPO after midnight    *H/o sleep apnea  -Patient states sleep apnea resolved after weight loss    *GERD  -C/w home meds    *DVT ppx  amb  venodynes    dispo: OR tomorrow

## 2021-09-01 NOTE — PROGRESS NOTE ADULT - ATTENDING COMMENTS
Patient is seen and examined at bedside with NP Zulema Ramirez  Admitted with back pain 2 to HNP  Pain is OK controlled on the current regimen  Planned for OR tomorrow  Npo p MN, hold chemical DVT proph for OR  Patient is medically optimized for OR. D/w pt

## 2021-09-02 ENCOUNTER — RESULT REVIEW (OUTPATIENT)
Age: 31
End: 2021-09-02

## 2021-09-02 LAB
ANION GAP SERPL CALC-SCNC: 2 MMOL/L — LOW (ref 5–17)
ANION GAP SERPL CALC-SCNC: 4 MMOL/L — LOW (ref 5–17)
APTT BLD: 32.5 SEC — SIGNIFICANT CHANGE UP (ref 27.5–35.5)
BASOPHILS # BLD AUTO: 0.01 K/UL — SIGNIFICANT CHANGE UP (ref 0–0.2)
BASOPHILS NFR BLD AUTO: 0.1 % — SIGNIFICANT CHANGE UP (ref 0–2)
BUN SERPL-MCNC: 11 MG/DL — SIGNIFICANT CHANGE UP (ref 7–23)
BUN SERPL-MCNC: 13 MG/DL — SIGNIFICANT CHANGE UP (ref 7–23)
CALCIUM SERPL-MCNC: 8.2 MG/DL — LOW (ref 8.5–10.1)
CALCIUM SERPL-MCNC: 8.5 MG/DL — SIGNIFICANT CHANGE UP (ref 8.5–10.1)
CHLORIDE SERPL-SCNC: 108 MMOL/L — SIGNIFICANT CHANGE UP (ref 96–108)
CHLORIDE SERPL-SCNC: 109 MMOL/L — HIGH (ref 96–108)
CO2 SERPL-SCNC: 25 MMOL/L — SIGNIFICANT CHANGE UP (ref 22–31)
CO2 SERPL-SCNC: 28 MMOL/L — SIGNIFICANT CHANGE UP (ref 22–31)
CREAT SERPL-MCNC: 0.84 MG/DL — SIGNIFICANT CHANGE UP (ref 0.5–1.3)
CREAT SERPL-MCNC: 1 MG/DL — SIGNIFICANT CHANGE UP (ref 0.5–1.3)
EOSINOPHIL # BLD AUTO: 0 K/UL — SIGNIFICANT CHANGE UP (ref 0–0.5)
EOSINOPHIL NFR BLD AUTO: 0 % — SIGNIFICANT CHANGE UP (ref 0–6)
GLUCOSE SERPL-MCNC: 115 MG/DL — HIGH (ref 70–99)
GLUCOSE SERPL-MCNC: 80 MG/DL — SIGNIFICANT CHANGE UP (ref 70–99)
HCT VFR BLD CALC: 37.3 % — LOW (ref 39–50)
HCT VFR BLD CALC: 40.8 % — SIGNIFICANT CHANGE UP (ref 39–50)
HGB BLD-MCNC: 12.8 G/DL — LOW (ref 13–17)
HGB BLD-MCNC: 14 G/DL — SIGNIFICANT CHANGE UP (ref 13–17)
IMM GRANULOCYTES NFR BLD AUTO: 0.6 % — SIGNIFICANT CHANGE UP (ref 0–1.5)
INR BLD: 1.09 RATIO — SIGNIFICANT CHANGE UP (ref 0.88–1.16)
LYMPHOCYTES # BLD AUTO: 0.76 K/UL — LOW (ref 1–3.3)
LYMPHOCYTES # BLD AUTO: 6.7 % — LOW (ref 13–44)
MCHC RBC-ENTMCNC: 29.6 PG — SIGNIFICANT CHANGE UP (ref 27–34)
MCHC RBC-ENTMCNC: 29.7 PG — SIGNIFICANT CHANGE UP (ref 27–34)
MCHC RBC-ENTMCNC: 34.3 GM/DL — SIGNIFICANT CHANGE UP (ref 32–36)
MCHC RBC-ENTMCNC: 34.3 GM/DL — SIGNIFICANT CHANGE UP (ref 32–36)
MCV RBC AUTO: 86.3 FL — SIGNIFICANT CHANGE UP (ref 80–100)
MCV RBC AUTO: 86.4 FL — SIGNIFICANT CHANGE UP (ref 80–100)
MONOCYTES # BLD AUTO: 0.09 K/UL — SIGNIFICANT CHANGE UP (ref 0–0.9)
MONOCYTES NFR BLD AUTO: 0.8 % — LOW (ref 2–14)
NEUTROPHILS # BLD AUTO: 10.42 K/UL — HIGH (ref 1.8–7.4)
NEUTROPHILS NFR BLD AUTO: 91.8 % — HIGH (ref 43–77)
PLATELET # BLD AUTO: 198 K/UL — SIGNIFICANT CHANGE UP (ref 150–400)
PLATELET # BLD AUTO: 204 K/UL — SIGNIFICANT CHANGE UP (ref 150–400)
POTASSIUM SERPL-MCNC: 3.8 MMOL/L — SIGNIFICANT CHANGE UP (ref 3.5–5.3)
POTASSIUM SERPL-MCNC: 3.9 MMOL/L — SIGNIFICANT CHANGE UP (ref 3.5–5.3)
POTASSIUM SERPL-SCNC: 3.8 MMOL/L — SIGNIFICANT CHANGE UP (ref 3.5–5.3)
POTASSIUM SERPL-SCNC: 3.9 MMOL/L — SIGNIFICANT CHANGE UP (ref 3.5–5.3)
PROTHROM AB SERPL-ACNC: 12.7 SEC — SIGNIFICANT CHANGE UP (ref 10.6–13.6)
RBC # BLD: 4.32 M/UL — SIGNIFICANT CHANGE UP (ref 4.2–5.8)
RBC # BLD: 4.72 M/UL — SIGNIFICANT CHANGE UP (ref 4.2–5.8)
RBC # FLD: 12.6 % — SIGNIFICANT CHANGE UP (ref 10.3–14.5)
RBC # FLD: 12.6 % — SIGNIFICANT CHANGE UP (ref 10.3–14.5)
SODIUM SERPL-SCNC: 138 MMOL/L — SIGNIFICANT CHANGE UP (ref 135–145)
SODIUM SERPL-SCNC: 138 MMOL/L — SIGNIFICANT CHANGE UP (ref 135–145)
WBC # BLD: 11.35 K/UL — HIGH (ref 3.8–10.5)
WBC # BLD: 7.34 K/UL — SIGNIFICANT CHANGE UP (ref 3.8–10.5)
WBC # FLD AUTO: 11.35 K/UL — HIGH (ref 3.8–10.5)
WBC # FLD AUTO: 7.34 K/UL — SIGNIFICANT CHANGE UP (ref 3.8–10.5)

## 2021-09-02 PROCEDURE — 88304 TISSUE EXAM BY PATHOLOGIST: CPT | Mod: 26

## 2021-09-02 PROCEDURE — 99232 SBSQ HOSP IP/OBS MODERATE 35: CPT

## 2021-09-02 RX ORDER — MAGNESIUM HYDROXIDE 400 MG/1
30 TABLET, CHEWABLE ORAL EVERY 12 HOURS
Refills: 0 | Status: DISCONTINUED | OUTPATIENT
Start: 2021-09-02 | End: 2021-09-06

## 2021-09-02 RX ORDER — DIPHENHYDRAMINE HCL 50 MG
12.5 CAPSULE ORAL EVERY 4 HOURS
Refills: 0 | Status: DISCONTINUED | OUTPATIENT
Start: 2021-09-02 | End: 2021-09-06

## 2021-09-02 RX ORDER — ACETAMINOPHEN 500 MG
650 TABLET ORAL EVERY 6 HOURS
Refills: 0 | Status: DISCONTINUED | OUTPATIENT
Start: 2021-09-02 | End: 2021-09-06

## 2021-09-02 RX ORDER — CELECOXIB 200 MG/1
200 CAPSULE ORAL EVERY 12 HOURS
Refills: 0 | Status: DISCONTINUED | OUTPATIENT
Start: 2021-09-02 | End: 2021-09-06

## 2021-09-02 RX ORDER — ONDANSETRON 8 MG/1
4 TABLET, FILM COATED ORAL EVERY 6 HOURS
Refills: 0 | Status: DISCONTINUED | OUTPATIENT
Start: 2021-09-02 | End: 2021-09-02

## 2021-09-02 RX ORDER — HYDROMORPHONE HYDROCHLORIDE 2 MG/ML
0.5 INJECTION INTRAMUSCULAR; INTRAVENOUS; SUBCUTANEOUS
Refills: 0 | Status: DISCONTINUED | OUTPATIENT
Start: 2021-09-02 | End: 2021-09-02

## 2021-09-02 RX ORDER — FENTANYL CITRATE 50 UG/ML
25 INJECTION INTRAVENOUS
Refills: 0 | Status: DISCONTINUED | OUTPATIENT
Start: 2021-09-02 | End: 2021-09-02

## 2021-09-02 RX ORDER — CEFAZOLIN SODIUM 1 G
2000 VIAL (EA) INJECTION EVERY 8 HOURS
Refills: 0 | Status: COMPLETED | OUTPATIENT
Start: 2021-09-02 | End: 2021-09-03

## 2021-09-02 RX ORDER — GABAPENTIN 400 MG/1
300 CAPSULE ORAL THREE TIMES A DAY
Refills: 0 | Status: DISCONTINUED | OUTPATIENT
Start: 2021-09-02 | End: 2021-09-02

## 2021-09-02 RX ORDER — PROCHLORPERAZINE MALEATE 5 MG
10 TABLET ORAL ONCE
Refills: 0 | Status: DISCONTINUED | OUTPATIENT
Start: 2021-09-02 | End: 2021-09-06

## 2021-09-02 RX ORDER — OXYCODONE HYDROCHLORIDE 5 MG/1
10 TABLET ORAL EVERY 4 HOURS
Refills: 0 | Status: DISCONTINUED | OUTPATIENT
Start: 2021-09-02 | End: 2021-09-06

## 2021-09-02 RX ORDER — SODIUM CHLORIDE 9 MG/ML
1000 INJECTION, SOLUTION INTRAVENOUS
Refills: 0 | Status: DISCONTINUED | OUTPATIENT
Start: 2021-09-02 | End: 2021-09-06

## 2021-09-02 RX ORDER — CYCLOBENZAPRINE HYDROCHLORIDE 10 MG/1
10 TABLET, FILM COATED ORAL EVERY 8 HOURS
Refills: 0 | Status: DISCONTINUED | OUTPATIENT
Start: 2021-09-02 | End: 2021-09-04

## 2021-09-02 RX ORDER — PROCHLORPERAZINE MALEATE 5 MG
10 TABLET ORAL ONCE
Refills: 0 | Status: COMPLETED | OUTPATIENT
Start: 2021-09-02 | End: 2021-09-02

## 2021-09-02 RX ORDER — ONDANSETRON 8 MG/1
4 TABLET, FILM COATED ORAL ONCE
Refills: 0 | Status: COMPLETED | OUTPATIENT
Start: 2021-09-02 | End: 2021-09-02

## 2021-09-02 RX ORDER — OXYCODONE HYDROCHLORIDE 5 MG/1
5 TABLET ORAL EVERY 4 HOURS
Refills: 0 | Status: DISCONTINUED | OUTPATIENT
Start: 2021-09-02 | End: 2021-09-06

## 2021-09-02 RX ORDER — SENNA PLUS 8.6 MG/1
2 TABLET ORAL AT BEDTIME
Refills: 0 | Status: DISCONTINUED | OUTPATIENT
Start: 2021-09-02 | End: 2021-09-06

## 2021-09-02 RX ORDER — TRAMADOL HYDROCHLORIDE 50 MG/1
50 TABLET ORAL EVERY 6 HOURS
Refills: 0 | Status: DISCONTINUED | OUTPATIENT
Start: 2021-09-02 | End: 2021-09-06

## 2021-09-02 RX ORDER — OXYCODONE HYDROCHLORIDE 5 MG/1
5 TABLET ORAL ONCE
Refills: 0 | Status: DISCONTINUED | OUTPATIENT
Start: 2021-09-02 | End: 2021-09-02

## 2021-09-02 RX ADMIN — HYDROMORPHONE HYDROCHLORIDE 0.5 MILLIGRAM(S): 2 INJECTION INTRAMUSCULAR; INTRAVENOUS; SUBCUTANEOUS at 17:47

## 2021-09-02 RX ADMIN — HYDROMORPHONE HYDROCHLORIDE 0.5 MILLIGRAM(S): 2 INJECTION INTRAMUSCULAR; INTRAVENOUS; SUBCUTANEOUS at 09:13

## 2021-09-02 RX ADMIN — CYCLOBENZAPRINE HYDROCHLORIDE 10 MILLIGRAM(S): 10 TABLET, FILM COATED ORAL at 21:05

## 2021-09-02 RX ADMIN — Medication 25 MILLIGRAM(S): at 21:35

## 2021-09-02 RX ADMIN — Medication 10 MILLIGRAM(S): at 18:23

## 2021-09-02 RX ADMIN — OXYCODONE HYDROCHLORIDE 10 MILLIGRAM(S): 5 TABLET ORAL at 05:59

## 2021-09-02 RX ADMIN — GABAPENTIN 300 MILLIGRAM(S): 400 CAPSULE ORAL at 05:29

## 2021-09-02 RX ADMIN — SODIUM CHLORIDE 75 MILLILITER(S): 9 INJECTION, SOLUTION INTRAVENOUS at 17:48

## 2021-09-02 RX ADMIN — PANTOPRAZOLE SODIUM 40 MILLIGRAM(S): 20 TABLET, DELAYED RELEASE ORAL at 09:13

## 2021-09-02 RX ADMIN — CELECOXIB 200 MILLIGRAM(S): 200 CAPSULE ORAL at 21:05

## 2021-09-02 RX ADMIN — GABAPENTIN 300 MILLIGRAM(S): 400 CAPSULE ORAL at 21:05

## 2021-09-02 RX ADMIN — HYDROMORPHONE HYDROCHLORIDE 0.5 MILLIGRAM(S): 2 INJECTION INTRAMUSCULAR; INTRAVENOUS; SUBCUTANEOUS at 18:15

## 2021-09-02 RX ADMIN — BUPROPION HYDROCHLORIDE 150 MILLIGRAM(S): 150 TABLET, EXTENDED RELEASE ORAL at 09:13

## 2021-09-02 RX ADMIN — HYDROMORPHONE HYDROCHLORIDE 0.5 MILLIGRAM(S): 2 INJECTION INTRAMUSCULAR; INTRAVENOUS; SUBCUTANEOUS at 22:13

## 2021-09-02 RX ADMIN — Medication 20 MILLIGRAM(S): at 09:13

## 2021-09-02 RX ADMIN — ONDANSETRON 4 MILLIGRAM(S): 8 TABLET, FILM COATED ORAL at 17:46

## 2021-09-02 RX ADMIN — HYDROMORPHONE HYDROCHLORIDE 0.5 MILLIGRAM(S): 2 INJECTION INTRAMUSCULAR; INTRAVENOUS; SUBCUTANEOUS at 18:20

## 2021-09-02 RX ADMIN — HYDROMORPHONE HYDROCHLORIDE 0.5 MILLIGRAM(S): 2 INJECTION INTRAMUSCULAR; INTRAVENOUS; SUBCUTANEOUS at 21:43

## 2021-09-02 RX ADMIN — OXYCODONE HYDROCHLORIDE 10 MILLIGRAM(S): 5 TABLET ORAL at 05:29

## 2021-09-02 NOTE — BRIEF OPERATIVE NOTE - NSICDXBRIEFPREOP_GEN_ALL_CORE_FT
PRE-OP DIAGNOSIS:  HNP (herniated nucleus pulposus), lumbar 02-Sep-2021 17:41:48  Michael Quintero

## 2021-09-02 NOTE — BRIEF OPERATIVE NOTE - NSICDXBRIEFPOSTOP_GEN_ALL_CORE_FT
POST-OP DIAGNOSIS:  HNP (herniated nucleus pulposus), lumbar 02-Sep-2021 17:41:56  Michael Quintero

## 2021-09-02 NOTE — PROGRESS NOTE ADULT - ASSESSMENT
A/P:  30 yo M s/p L5-S1 microdiscectomy (07/2021) with re-herniation:  VSS, afeb  NPO, IVF while NPO, Plan for OR today  Continue home meds  SCDs for dvt ppx  WBAT  Consent in chart  Appreciate medicine note: Patient is low risk for major cardiac complications in perioperative for intermediate risk surgery  Discussed case with Dr. Tariq.

## 2021-09-02 NOTE — PROGRESS NOTE ADULT - SUBJECTIVE AND OBJECTIVE BOX
30 y/o M w/ PMH of sleep apnea (resolved since weight loss), GERD, h/o partial laminectomy, p/w back pain and LLE radiculopathy.  Patient states that he developed sudden onset back pain yesterday and LLE numbness reaching for his phone . States he has back pain when he ambulates, and it improves with rest. Denies urinary / fecal incontinence / saddle anesthesia. Denies CP, SOB, cough, runny nose, sore throat, nausea, vomiting, abdominal pain, fever, chills. Denies prior hx or CVA / DM2 / CKD / CHF. Denies any previous complications related to surgery / anesthesia       9/2: seen at bedside, sleeping, easily arousable,  NPO for surgery this afternoon, presently comfortable,     ROS:  all 10 systems reviewed and are neg with pertinent positives documented in HPI    Vital Signs Last 24 Hrs  T(C): 36.6 (09-02-21 @ 08:44), Max: 36.7 (09-01-21 @ 20:03)  T(F): 97.8 (09-02-21 @ 08:44), Max: 98.1 (09-01-21 @ 20:03)  HR: 54 (09-02-21 @ 08:44) (54 - 74)  BP: 92/49 (09-02-21 @ 08:44) (92/49 - 113/60)  BP(mean): --  RR: 16 (09-02-21 @ 08:44) (16 - 16)  SpO2: 98% (09-02-21 @ 08:44) (98% - 100%)    MEDICATIONS  (STANDING):  buPROPion XL (24-Hour) . 150 milliGRAM(s) Oral daily  FLUoxetine 20 milliGRAM(s) Oral daily  gabapentin 300 milliGRAM(s) Oral every 8 hours  lactated ringers. 1000 milliLiter(s) (75 mL/Hr) IV Continuous <Continuous>  pantoprazole    Tablet 40 milliGRAM(s) Oral before breakfast    MEDICATIONS  (PRN):  acetaminophen   Tablet .. 975 milliGRAM(s) Oral every 6 hours PRN Temp greater or equal to 38C (100.4F), Mild Pain (1 - 3)  diphenhydrAMINE 25 milliGRAM(s) Oral every 4 hours PRN Insomnia  HYDROmorphone  Injectable 0.5 milliGRAM(s) SubCutaneous every 4 hours PRN Severe Pain (7 - 10)  ondansetron Injectable 4 milliGRAM(s) IV Push every 6 hours PRN Nausea  oxyCODONE    IR 5 milliGRAM(s) Oral every 4 hours PRN Mild Pain (1 - 3)  oxyCODONE    IR 10 milliGRAM(s) Oral every 4 hours PRN Moderate Pain (4 - 6)    PHYSICAL EXAM:    GENERAL: Comfortable, no acute distress   HEAD:  Normocephalic, atraumatic  EYES: EOMI, PERRLA  HEENT: Moist mucous membranes  NECK: Supple, No JVD  NERVOUS SYSTEM:  Alert & Oriented X3, Motor Strength 5/5 B/L upper and lower extremities  CHEST/LUNG: Clear to auscultation bilaterally  HEART: Regular rate and rhythm  ABDOMEN: Soft, non tender, Nondistended, Bowel sounds present  GENITOURINARY: Voiding, no palpable bladder  EXTREMITIES:   No clubbing, cyanosis, or edema  MUSCULOSKELETAL- + low back pain with LLE radiculopathy  SKIN-no rash        labs:                          12.8   7.34  )-----------( 198      ( 02 Sep 2021 05:00 )             37.3       09-02    138  |  108  |  13  ----------------------------<  80  3.9   |  28  |  0.84    Ca    8.5      02 Sep 2021 05:00    TPro  7.7  /  Alb  4.2  /  TBili  0.7  /  DBili  x   /  AST  29  /  ALT  45  /  AlkPhos  58  08-31      PT/INR - ( 02 Sep 2021 05:00 )   PT: 12.7 sec;   INR: 1.09 ratio         PTT - ( 02 Sep 2021 05:00 )  PTT:32.5 sec    < from: Xray Chest 2 Views PA/Lat (08.31.21 @ 16:39) >    EXAM:  XR CHEST PA LAT 2V                            PROCEDURE DATE:  08/31/2021          INTERPRETATION:  Chest 2 views    HISTORY: Preop    COMPARISON: 7/1/2021    Frontal and lateral views of the chest were obtained with suboptimal inspiration. With allowance for this, the heart is normal in size and the lungs are clear. There is no evidence of pneumothorax nor pleural effusion.    IMPRESSION: No active pulmonary disease.          < from: Xray Lumbosacral Spine (08.31.21 @ 16:38) >    EXAM:  XR LS SPINE AP LAT 2-3 VIEWS                            PROCEDURE DATE:  08/31/2021          INTERPRETATION:  Lumbar spine:    HISTORY: Low back pain    COMPARISON: 8/23/2010    Three views of the lumbar spine reveal normal alignment and curvature in the lateral view. Mild levoscoliosis is seen in the frontal view.    The vertebral bodies are satisfactory in height with no evidence of recent or old compression fracture. The pedicles, spinous processes and transverse processes and sacroiliac joints with similar accessory SI joints appear normal. The disc spaces show mild narrowing of L5-S1.    IMPRESSION:  No acute bony pathology of the lumbosacral spine.    < from: MR Lumbar Spine w/wo IV Cont (08.30.21 @ 23:05) >  EXAM:  MR SPINE LUMBAR WAW IC                          PROCEDURE DATE:  08/30/2021          INTERPRETATION:  Trey ELIZONDO MD, have reviewed the images and the report and agree with the findings, with the following modifications:    IV contrast: 7.5 mL IV Gadavist.    Agree that the patient is status post left hemilaminectomy at L5-S1 with associated postsurgical changes. There is enhancement within the dorsal subcutaneous soft tissues and paraspinal muscles extending into the laminectomy bed without evidence of drainable fluid collection. Agree that there is enhancement within the left lateral common dorsal and ventral epidural space at the L5-S1 level which may be secondary to postsurgical changes although phlegmon/infection can have a similar appearance and clinical correlation is recommended. No evidence of drainable fluid collection within the epidural space.    Agree that there is a left lateral recess/foraminal disc herniation at L5-S1 with mass effect on the descending left S1 and exiting left L5 nerve roots.    Additional findings:    There is dextroscoliosis of the thoracolumbar junction. The vertebral body heights are maintained. There is trace retrolisthesis of L5 on S1. There is disc desiccation and disc space narrowing at L5-S1. The conus medullaris terminates at the L1-L2 level and is normal signal and morphology.    At L1-L2 there is a disc bulge without stenotic and spinal canal stenosis or neural foraminal narrowing.    At L4-L5 there is a disc bulge with facet hypertrophy with mild spinal canal stenosis and bilateral neural foraminal narrowing.                               IMP: 30 y/o M w/  above PMH a/w:    *Back pain w/ Scheduled for OR today for revision of lumbar decompression  -Patient is low risk for major cardiac complications in perioperative for intermediate risk surgery   -EKG reviewed by me: NSR, no ectopy  -admit to spine  NPO   PT eval post op  fitted for LSO brace    *H/o sleep apnea  -Patient states sleep apnea resolved after weight loss    *GERD  -C/w home meds    *DVT ppx  amb  venodynes    dispo: OR today

## 2021-09-02 NOTE — PROGRESS NOTE ADULT - SUBJECTIVE AND OBJECTIVE BOX
Pt seen resting in bed comfortably. Pt has constant low back pain. Pain radiates to left buttock, left hamstring, left ant tib to the dorsal aspect of left foot with numbness and weakness. Medications are helping with the pain. Pt denies bowel and bladder dysfunction. No acute events overnight. Plan for OR today.    Vital Signs Last 24 Hrs  T(C): 36.6 (02 Sep 2021 08:44), Max: 36.7 (01 Sep 2021 20:03)  T(F): 97.8 (02 Sep 2021 08:44), Max: 98.1 (01 Sep 2021 20:03)  HR: 54 (02 Sep 2021 08:44) (54 - 74)  BP: 92/49 (02 Sep 2021 08:44) (92/49 - 113/60)  BP(mean): --  RR: 16 (02 Sep 2021 08:44) (16 - 16)  SpO2: 98% (02 Sep 2021 08:44) (98% - 100%)    PE  Gen appearance: NAD  Motor strength: 4/5 of the left EHL, 4/5 of the left ant tib and left gastroc, 4/5 of the left HF and left quad. 5/5 of the right quad, HF, ant tib, gastrocs, and EHL.  Sensation: numbness of the left quad, left ant tib, dorsal aspect of left foot.  tenderness to palpation of the left buttock and left paraspinal region of the lumbar region.  Otherwise motor and sensory intact.

## 2021-09-02 NOTE — PROGRESS NOTE ADULT - ATTENDING COMMENTS
Patient is seen and examined at bedside with NP Zulema Ramirez  NPO for OR today  IVF, pain meds prn  Patient is medically optimized for OR  Agree with above assessment and plan. D/w pt

## 2021-09-02 NOTE — PROGRESS NOTE ADULT - SUBJECTIVE AND OBJECTIVE BOX
Postop Check    Patient tolerated the procedure well. Patient seen and examined at bedside. No acute complaints at this time. Pain well controlled. Denies weakness, numbness or tingling. Denies chest pain, shortness of breath, nausea or vomiting.     PE:  Vital Signs Last 24 Hrs  T(C): 36.4 (09-02-21 @ 20:00), Max: 36.6 (09-02-21 @ 08:44)  T(F): 97.6 (09-02-21 @ 20:00), Max: 97.8 (09-02-21 @ 08:44)  HR: 88 (09-02-21 @ 20:00) (54 - 90)  BP: 141/88 (09-02-21 @ 20:00) (92/49 - 151/89)  BP(mean): --  RR: 12 (09-02-21 @ 20:00) (12 - 18)  SpO2: 96% (09-02-21 @ 20:00) (96% - 100%)    General: NAD, resting comforatbly in bed    Dressing C/D/I  2+ DP Pulses BL    Motor:                   L2                  L3             L4              L5            S1  R            5/5                5/5             5/5            5/5          5/5  L             3/5                3/5            3/5            3/5          5/5    Sensory:               L2          L3         L4      L5       S1         (0=absent, 1=impaired, 2=normal, NT=not testable)  R         2            2            2        2        2  L          2            2           2        2         2                              14.0   11.35 )-----------( 204      ( 02 Sep 2021 18:42 )             40.8     02 Sep 2021 18:42    138    |  109    |  11     ----------------------------<  115    3.8     |  25     |  1.00     Ca    8.2        02 Sep 2021 18:42      PT/INR - ( 02 Sep 2021 05:00 )   PT: 12.7 sec;   INR: 1.09 ratio         PTT - ( 02 Sep 2021 05:00 )  PTT:32.5 sec    A/P:  31y m s/p L5-S1 TLFI POD 0  -PT/OT -WBAT  -Pain Control  -No Chemical DVT ppx   -SCDs  -Continue perioperative abx x 24 hours  -FU AM Labs  -Incentive Spirometry  -Medical management appreciated  -Dispo planning

## 2021-09-03 LAB
ANION GAP SERPL CALC-SCNC: 3 MMOL/L — LOW (ref 5–17)
BASOPHILS # BLD AUTO: 0.02 K/UL — SIGNIFICANT CHANGE UP (ref 0–0.2)
BASOPHILS NFR BLD AUTO: 0.2 % — SIGNIFICANT CHANGE UP (ref 0–2)
BUN SERPL-MCNC: 10 MG/DL — SIGNIFICANT CHANGE UP (ref 7–23)
CALCIUM SERPL-MCNC: 8.6 MG/DL — SIGNIFICANT CHANGE UP (ref 8.5–10.1)
CHLORIDE SERPL-SCNC: 108 MMOL/L — SIGNIFICANT CHANGE UP (ref 96–108)
CO2 SERPL-SCNC: 27 MMOL/L — SIGNIFICANT CHANGE UP (ref 22–31)
CREAT SERPL-MCNC: 0.82 MG/DL — SIGNIFICANT CHANGE UP (ref 0.5–1.3)
EOSINOPHIL # BLD AUTO: 0.02 K/UL — SIGNIFICANT CHANGE UP (ref 0–0.5)
EOSINOPHIL NFR BLD AUTO: 0.2 % — SIGNIFICANT CHANGE UP (ref 0–6)
GLUCOSE SERPL-MCNC: 103 MG/DL — HIGH (ref 70–99)
HCT VFR BLD CALC: 36.9 % — LOW (ref 39–50)
HGB BLD-MCNC: 13.1 G/DL — SIGNIFICANT CHANGE UP (ref 13–17)
IMM GRANULOCYTES NFR BLD AUTO: 0.3 % — SIGNIFICANT CHANGE UP (ref 0–1.5)
LYMPHOCYTES # BLD AUTO: 1.34 K/UL — SIGNIFICANT CHANGE UP (ref 1–3.3)
LYMPHOCYTES # BLD AUTO: 11.3 % — LOW (ref 13–44)
MCHC RBC-ENTMCNC: 30.8 PG — SIGNIFICANT CHANGE UP (ref 27–34)
MCHC RBC-ENTMCNC: 35.5 GM/DL — SIGNIFICANT CHANGE UP (ref 32–36)
MCV RBC AUTO: 86.8 FL — SIGNIFICANT CHANGE UP (ref 80–100)
MONOCYTES # BLD AUTO: 0.9 K/UL — SIGNIFICANT CHANGE UP (ref 0–0.9)
MONOCYTES NFR BLD AUTO: 7.6 % — SIGNIFICANT CHANGE UP (ref 2–14)
NEUTROPHILS # BLD AUTO: 9.52 K/UL — HIGH (ref 1.8–7.4)
NEUTROPHILS NFR BLD AUTO: 80.4 % — HIGH (ref 43–77)
PLATELET # BLD AUTO: 207 K/UL — SIGNIFICANT CHANGE UP (ref 150–400)
POTASSIUM SERPL-MCNC: 3.7 MMOL/L — SIGNIFICANT CHANGE UP (ref 3.5–5.3)
POTASSIUM SERPL-SCNC: 3.7 MMOL/L — SIGNIFICANT CHANGE UP (ref 3.5–5.3)
RBC # BLD: 4.25 M/UL — SIGNIFICANT CHANGE UP (ref 4.2–5.8)
RBC # FLD: 12.8 % — SIGNIFICANT CHANGE UP (ref 10.3–14.5)
SODIUM SERPL-SCNC: 138 MMOL/L — SIGNIFICANT CHANGE UP (ref 135–145)
WBC # BLD: 11.83 K/UL — HIGH (ref 3.8–10.5)
WBC # FLD AUTO: 11.83 K/UL — HIGH (ref 3.8–10.5)

## 2021-09-03 PROCEDURE — 99232 SBSQ HOSP IP/OBS MODERATE 35: CPT

## 2021-09-03 RX ORDER — ACETAMINOPHEN 500 MG
1000 TABLET ORAL ONCE
Refills: 0 | Status: COMPLETED | OUTPATIENT
Start: 2021-09-03 | End: 2021-09-03

## 2021-09-03 RX ADMIN — Medication 100 MILLIGRAM(S): at 00:06

## 2021-09-03 RX ADMIN — GABAPENTIN 300 MILLIGRAM(S): 400 CAPSULE ORAL at 13:04

## 2021-09-03 RX ADMIN — CYCLOBENZAPRINE HYDROCHLORIDE 10 MILLIGRAM(S): 10 TABLET, FILM COATED ORAL at 05:11

## 2021-09-03 RX ADMIN — Medication 400 MILLIGRAM(S): at 20:17

## 2021-09-03 RX ADMIN — OXYCODONE HYDROCHLORIDE 10 MILLIGRAM(S): 5 TABLET ORAL at 21:16

## 2021-09-03 RX ADMIN — CELECOXIB 200 MILLIGRAM(S): 200 CAPSULE ORAL at 21:16

## 2021-09-03 RX ADMIN — HYDROMORPHONE HYDROCHLORIDE 0.5 MILLIGRAM(S): 2 INJECTION INTRAMUSCULAR; INTRAVENOUS; SUBCUTANEOUS at 07:28

## 2021-09-03 RX ADMIN — OXYCODONE HYDROCHLORIDE 10 MILLIGRAM(S): 5 TABLET ORAL at 10:35

## 2021-09-03 RX ADMIN — HYDROMORPHONE HYDROCHLORIDE 0.5 MILLIGRAM(S): 2 INJECTION INTRAMUSCULAR; INTRAVENOUS; SUBCUTANEOUS at 22:15

## 2021-09-03 RX ADMIN — HYDROMORPHONE HYDROCHLORIDE 0.5 MILLIGRAM(S): 2 INJECTION INTRAMUSCULAR; INTRAVENOUS; SUBCUTANEOUS at 03:34

## 2021-09-03 RX ADMIN — BUPROPION HYDROCHLORIDE 150 MILLIGRAM(S): 150 TABLET, EXTENDED RELEASE ORAL at 09:46

## 2021-09-03 RX ADMIN — HYDROMORPHONE HYDROCHLORIDE 0.5 MILLIGRAM(S): 2 INJECTION INTRAMUSCULAR; INTRAVENOUS; SUBCUTANEOUS at 13:30

## 2021-09-03 RX ADMIN — CYCLOBENZAPRINE HYDROCHLORIDE 10 MILLIGRAM(S): 10 TABLET, FILM COATED ORAL at 13:04

## 2021-09-03 RX ADMIN — Medication 20 MILLIGRAM(S): at 09:46

## 2021-09-03 RX ADMIN — Medication 100 MILLIGRAM(S): at 09:45

## 2021-09-03 RX ADMIN — OXYCODONE HYDROCHLORIDE 10 MILLIGRAM(S): 5 TABLET ORAL at 16:52

## 2021-09-03 RX ADMIN — OXYCODONE HYDROCHLORIDE 10 MILLIGRAM(S): 5 TABLET ORAL at 21:46

## 2021-09-03 RX ADMIN — PANTOPRAZOLE SODIUM 40 MILLIGRAM(S): 20 TABLET, DELAYED RELEASE ORAL at 09:46

## 2021-09-03 RX ADMIN — Medication 25 MILLIGRAM(S): at 22:16

## 2021-09-03 RX ADMIN — CELECOXIB 200 MILLIGRAM(S): 200 CAPSULE ORAL at 21:18

## 2021-09-03 RX ADMIN — CELECOXIB 200 MILLIGRAM(S): 200 CAPSULE ORAL at 09:45

## 2021-09-03 RX ADMIN — GABAPENTIN 300 MILLIGRAM(S): 400 CAPSULE ORAL at 21:16

## 2021-09-03 RX ADMIN — CELECOXIB 200 MILLIGRAM(S): 200 CAPSULE ORAL at 09:47

## 2021-09-03 RX ADMIN — HYDROMORPHONE HYDROCHLORIDE 0.5 MILLIGRAM(S): 2 INJECTION INTRAMUSCULAR; INTRAVENOUS; SUBCUTANEOUS at 03:04

## 2021-09-03 RX ADMIN — HYDROMORPHONE HYDROCHLORIDE 0.5 MILLIGRAM(S): 2 INJECTION INTRAMUSCULAR; INTRAVENOUS; SUBCUTANEOUS at 18:20

## 2021-09-03 RX ADMIN — GABAPENTIN 300 MILLIGRAM(S): 400 CAPSULE ORAL at 05:11

## 2021-09-03 RX ADMIN — Medication 1000 MILLIGRAM(S): at 20:32

## 2021-09-03 RX ADMIN — CYCLOBENZAPRINE HYDROCHLORIDE 10 MILLIGRAM(S): 10 TABLET, FILM COATED ORAL at 21:16

## 2021-09-03 RX ADMIN — HYDROMORPHONE HYDROCHLORIDE 0.5 MILLIGRAM(S): 2 INJECTION INTRAMUSCULAR; INTRAVENOUS; SUBCUTANEOUS at 17:52

## 2021-09-03 RX ADMIN — HYDROMORPHONE HYDROCHLORIDE 0.5 MILLIGRAM(S): 2 INJECTION INTRAMUSCULAR; INTRAVENOUS; SUBCUTANEOUS at 22:45

## 2021-09-03 RX ADMIN — HYDROMORPHONE HYDROCHLORIDE 0.5 MILLIGRAM(S): 2 INJECTION INTRAMUSCULAR; INTRAVENOUS; SUBCUTANEOUS at 13:04

## 2021-09-03 RX ADMIN — OXYCODONE HYDROCHLORIDE 10 MILLIGRAM(S): 5 TABLET ORAL at 17:40

## 2021-09-03 RX ADMIN — HYDROMORPHONE HYDROCHLORIDE 0.5 MILLIGRAM(S): 2 INJECTION INTRAMUSCULAR; INTRAVENOUS; SUBCUTANEOUS at 07:01

## 2021-09-03 RX ADMIN — OXYCODONE HYDROCHLORIDE 10 MILLIGRAM(S): 5 TABLET ORAL at 09:46

## 2021-09-03 NOTE — PROGRESS NOTE ADULT - SUBJECTIVE AND OBJECTIVE BOX
C/C: back pain    HPI: 30 y/o M w/ PMH of sleep apnea (resolved since weight loss), GERD, L5-S1 microdiscectomy 7/21 , p/w back pain and LLE radiculopathy.  Patient states that he developed sudden onset back pain and LLE numbness reaching for his phone . States he has back pain when he ambulates, and it improves with rest. He was admitted with reherniation of his lumbar disc. Now s/p L5-S1 TLIF.   Pt seen and examined on 2N. Doing well. Has some pain in his back but was able to ambulate with physical therapy. no sob/chest pain. tolerating po. no difficulty voiding. no n/v. no bm yet.     ROS: all 10 systems reviewed and is as above otherwise negative.     Vital Signs Last 24 Hrs  T(C): 36.8 (03 Sep 2021 09:09), Max: 36.8 (03 Sep 2021 09:09)  T(F): 98.3 (03 Sep 2021 09:09), Max: 98.3 (03 Sep 2021 09:09)  HR: 676 (03 Sep 2021 09:09) (62 - 676)  BP: 138/82 (03 Sep 2021 09:09) (120/60 - 151/89)  BP(mean): 77 (03 Sep 2021 05:04) (77 - 77)  RR: 17 (03 Sep 2021 09:09) (12 - 18)  SpO2: 100% (03 Sep 2021 09:09) (96% - 100%)        PHYSICAL EXAM:    GENERAL: Comfortable, no acute distress   HEAD:  Normocephalic, atraumatic  EYES: EOMI, PERRLA  HEENT: Moist mucous membranes  NECK: Supple, No JVD  NERVOUS SYSTEM:  Alert & Oriented X3, Motor Strength 5/5 B/L upper and lower extremities  CHEST/LUNG: Clear to auscultation bilaterally  HEART: Regular rate and rhythm  ABDOMEN: Soft, non tender, Nondistended, Bowel sounds present  GENITOURINARY: Voiding, no palpable bladder  EXTREMITIES:   No clubbing, cyanosis, or edema  MUSCULOSKELETAL-lumbar brace in place  SKIN-no rash    LABS:                        13.1   11.83 )-----------( 207      ( 03 Sep 2021 09:49 )             36.9     09-03    138  |  108  |  10  ----------------------------<  103<H>  3.7   |  27  |  0.82    Ca    8.6      03 Sep 2021 09:49      PT/INR - ( 02 Sep 2021 05:00 )   PT: 12.7 sec;   INR: 1.09 ratio         PTT - ( 02 Sep 2021 05:00 )  PTT:32.5 sec       Xray Chest 2 Views PA/Lat (08.31.21 @ 16:39) >    EXAM:  XR CHEST PA LAT 2V                        PROCEDURE DATE:  08/31/2021    INTERPRETATION:  Chest 2 views  HISTORY: Preop  COMPARISON: 7/1/2021  Frontal and lateral views of the chest were obtained with suboptimal inspiration. With allowance for this, the heart is normal in size and the lungs are clear. There is no evidence of pneumothorax nor pleural effusion.  IMPRESSION: No active pulmonary disease.          Xray Lumbosacral Spine (08.31.21 @ 16:38) >  EXAM:  XR LS SPINE AP LAT 2-3 VIEWS                        PROCEDURE DATE:  08/31/2021    INTERPRETATION:  Lumbar spine:  HISTORY: Low back pain  COMPARISON: 8/23/2010  Three views of the lumbar spine reveal normal alignment and curvature in the lateral view. Mild levoscoliosis is seen in the frontal view.  The vertebral bodies are satisfactory in height with no evidence of recent or old compression fracture. The pedicles, spinous processes and transverse processes and sacroiliac joints with similar accessory SI joints appear normal. The disc spaces show mild narrowing of L5-S1.  IMPRESSION:  No acute bony pathology of the lumbosacral spine.       MR Lumbar Spine w/wo IV Cont (08.30.21 @ 23:05) >  EXAM:  MR SPINE LUMBAR WAW IC                        PROCEDURE DATE:  08/30/2021    INTERPRETATION:  Trey ELIZONDO MD, have reviewed the images and the report and agree with the findings, with the following modifications:  IV contrast: 7.5 mL IV Gadavist.  Agree that the patient is status post left hemilaminectomy at L5-S1 with associated postsurgical changes. There is enhancement within the dorsal subcutaneous soft tissues and paraspinal muscles extending into the laminectomy bed without evidence of drainable fluid collection. Agree that there is enhancement within the left lateral common dorsal and ventral epidural space at the L5-S1 level which may be secondary to postsurgical changes although phlegmon/infection can have a similar appearance and clinical correlation is recommended. No evidence of drainable fluid collection within the epidural space.  Agree that there is a left lateral recess/foraminal disc herniation at L5-S1 with mass effect on the descending left S1 and exiting left L5 nerve roots.  Additional findings:  There is dextroscoliosis of the thoracolumbar junction. The vertebral body heights are maintained. There is trace retrolisthesis of L5 on S1. There is disc desiccation and disc space narrowing at L5-S1. The conus medullaris terminates at the L1-L2 level and is normal signal and morphology.  At L1-L2 there is a disc bulge without stenotic and spinal canal stenosis or neural foraminal narrowing.  At L4-L5 there is a disc bulge with facet hypertrophy with mild spinal canal stenosis and bilateral neural foraminal narrowing.                   MEDICATIONS  (STANDING):  buPROPion XL (24-Hour) . 150 milliGRAM(s) Oral daily  celecoxib 200 milliGRAM(s) Oral every 12 hours  cyclobenzaprine 10 milliGRAM(s) Oral every 8 hours  FLUoxetine 20 milliGRAM(s) Oral daily  gabapentin 300 milliGRAM(s) Oral every 8 hours  lactated ringers. 1000 milliLiter(s) (75 mL/Hr) IV Continuous <Continuous>  pantoprazole    Tablet 40 milliGRAM(s) Oral before breakfast  senna 2 Tablet(s) Oral at bedtime    MEDICATIONS  (PRN):  acetaminophen   Tablet .. 650 milliGRAM(s) Oral every 6 hours PRN Temp greater or equal to 38C (100.4F)  acetaminophen   Tablet .. 975 milliGRAM(s) Oral every 6 hours PRN Temp greater or equal to 38C (100.4F), Mild Pain (1 - 3)  diphenhydrAMINE 25 milliGRAM(s) Oral every 4 hours PRN Insomnia  diphenhydrAMINE   Injectable 12.5 milliGRAM(s) IV Push every 4 hours PRN Itching  HYDROmorphone  Injectable 0.5 milliGRAM(s) SubCutaneous every 4 hours PRN Severe Pain (7 - 10)  magnesium hydroxide Suspension 30 milliLiter(s) Oral every 12 hours PRN Constipation  ondansetron Injectable 4 milliGRAM(s) IV Push every 6 hours PRN Nausea  oxyCODONE    IR 5 milliGRAM(s) Oral every 4 hours PRN Moderate Pain (4 - 6)  oxyCODONE    IR 10 milliGRAM(s) Oral every 4 hours PRN Severe Pain (7 - 10)  prochlorperazine   Injectable 10 milliGRAM(s) IV Push once PRN Nausea not relieved by Zofran  traMADol 50 milliGRAM(s) Oral every 6 hours PRN Mild Pain (1 - 3)              ASSESSMENT AND PLAN:  30 y/o M w/  above PMH a/w:    #Recurrent lumbar disc herniation:  -s/p TLIF L5-S1 pod#1  -pain control wtih prn oxycodone/tramadol  -flexeril for muscle spasms  -physical therapy  -incentive spirometry  -bowel regimen.     #GERD  -PPI    #Anxiety/Depression:  -continue bupropion/fluoxetine.    *DVT ppx  amb  venodynes

## 2021-09-03 NOTE — PHYSICAL THERAPY INITIAL EVALUATION ADULT - ADDITIONAL COMMENTS
The pt reports living in a apartment with renters but he will be alone primarily. The pt reports having a full flight of steps 14+ to negotiate to get into his apartment with unilateral rails.

## 2021-09-03 NOTE — PROGRESS NOTE ADULT - ASSESSMENT
A/P: s/p left L5-S1 revision discectomy, MAS-TLIF - stable  1. PT/mobilization - needs to do stairs with PT

## 2021-09-03 NOTE — PHYSICAL THERAPY INITIAL EVALUATION ADULT - GENERAL OBSERVATIONS, REHAB EVAL
The pt was pleasant and cooperative, received on 2N, supine and eager to participate in PT evaluation. LSO present bedside and donned for ambulation, + 1 IV, and bilateral SCDs in place.

## 2021-09-03 NOTE — PROGRESS NOTE ADULT - SUBJECTIVE AND OBJECTIVE BOX
[facility]                                                           Orthopedic Spine Progress Note      POST OPERATIVE DAY #: 1  STATUS POST: left L5-S1 revision discectomy, MAS-TLIF                Pre-Op Dx: HNP (herniated nucleus pulposus), lumbar      Post-Op Dx:  HNP (herniated nucleus pulposus), lumbar      SUBJECTIVE: Patient seen and examined at 0830 this am, pain controlled, improvement in preoperative LE radicular symptoms, voiding, +flatus    Current Pain Management:  [ ] PCA   [x] Po Analgesics [x] IM /IV Analgesics     Vital Signs Last 24 Hrs  T(C): 36.8 (03 Sep 2021 09:09), Max: 36.8 (03 Sep 2021 09:09)  T(F): 98.3 (03 Sep 2021 09:09), Max: 98.3 (03 Sep 2021 09:09)  HR: 676 (03 Sep 2021 09:09) (62 - 676)  BP: 138/82 (03 Sep 2021 09:09) (120/60 - 151/89)  BP(mean): 77 (03 Sep 2021 05:04) (77 - 77)  RR: 17 (03 Sep 2021 09:09) (12 - 18)  SpO2: 100% (03 Sep 2021 09:09) (96% - 100%)  I&O's Detail    02 Sep 2021 07:01  -  03 Sep 2021 07:00  --------------------------------------------------------  IN:    Lactated Ringers: 300 mL    Other (mL): 1200 mL  Total IN: 1500 mL    OUT:    Voided (mL): 725 mL  Total OUT: 725 mL    Total NET: 775 mL      03 Sep 2021 07:01  -  03 Sep 2021 10:30  --------------------------------------------------------  IN:    Oral Fluid: 240 mL  Total IN: 240 mL    OUT:  Total OUT: 0 mL    Total NET: 240 mL          OBJECTIVE:      Wound /Dressing: dressing clean, dry, intact  Lumbar ROM: not tested  Neurological: A/O x 3              Sensation: [x] intact to light touch  [ ] decreased:          Motor exam: [x]            [x] Lower ext.       Hip Flx    Quad   Hamstrg         TA        EHL        GS                              R        5/5        5/5        5/5             5/5        5/5        5/5                                    L         5/5        5/5        5/5             5/5        5/5        5/5                                                              [x] Vascular: intact           Tension Signs: none          Long Tract Findings: none                                             LABS:                        14.0   11.35 )-----------( 204      ( 02 Sep 2021 18:42 )             40.8     09-02    138  |  109<H>  |  11  ----------------------------<  115<H>  3.8   |  25  |  1.00    Ca    8.2<L>      02 Sep 2021 18:42      PT/INR - ( 02 Sep 2021 05:00 )   PT: 12.7 sec;   INR: 1.09 ratio         PTT - ( 02 Sep 2021 05:00 )  PTT:32.5 sec

## 2021-09-03 NOTE — PHYSICAL THERAPY INITIAL EVALUATION ADULT - PERTINENT HX OF CURRENT PROBLEM, REHAB EVAL
32 y/o M w/ PMH of sleep apnea (resolved since weight loss), GERD, h/o partial laminectomy, p/w back pain and LLE radiculopathy.  Patient states that he developed sudden onset back pain yesterday and LLE numbness reaching for his phone . States he has back pain when he ambulates, and it improves with rest. Pt had recent hx of s/p L5-S1 microdiscectomy (07/2021) with re-herniation

## 2021-09-03 NOTE — PROGRESS NOTE ADULT - SUBJECTIVE AND OBJECTIVE BOX
Patient seen and examined at bedside. No acute complaints at this time. Pain well controlled. No acute events overnight. Denies weakness, numbness or tingling. Denies chest pain, shortness of breath, nausea or vomiting.     PE:  Vital Signs Last 24 Hrs  T(C): 36.4 (03 Sep 2021 05:04), Max: 36.6 (02 Sep 2021 08:44)  T(F): 97.5 (03 Sep 2021 05:04), Max: 97.9 (02 Sep 2021 20:35)  HR: 65 (03 Sep 2021 05:04) (54 - 90)  BP: 137/61 (03 Sep 2021 05:04) (92/49 - 151/89)  BP(mean): 77 (03 Sep 2021 05:04) (77 - 77)  RR: 17 (03 Sep 2021 05:04) (12 - 18)  SpO2: 100% (03 Sep 2021 05:04) (96% - 100%)    General: NAD, resting comforatbly in bed    Dressing C/D/I  2+ DP Pulses BL    Motor:                   L2                  L3             L4              L5            S1  R            5/5                5/5             5/5            5/5          5/5  L             4/5                4/5            4/5            4/5          5/5    Sensory:               L2          L3         L4      L5       S1         (0=absent, 1=impaired, 2=normal, NT=not testable)  R         2            2            2        2        2  L          2            2           2        2         2        A/P:  31y m s/p L5-S1 TLFI POD 1:  -PT/OT -WBAT  -Pain Control  -No Chemical DVT ppx  -SCDs  -Continue perioperative abx x 24 hours  -FU AM Labs  -Incentive Spirometry  -Medical management appreciated  -Dispo planning  Patient seen and examined at bedside. No acute complaints at this time. Pain well controlled. No acute events overnight. Denies weakness, numbness or tingling. Denies chest pain, shortness of breath, nausea or vomiting.     PE:  Vital Signs Last 24 Hrs  T(C): 36.4 (03 Sep 2021 05:04), Max: 36.6 (02 Sep 2021 08:44)  T(F): 97.5 (03 Sep 2021 05:04), Max: 97.9 (02 Sep 2021 20:35)  HR: 65 (03 Sep 2021 05:04) (54 - 90)  BP: 137/61 (03 Sep 2021 05:04) (92/49 - 151/89)  BP(mean): 77 (03 Sep 2021 05:04) (77 - 77)  RR: 17 (03 Sep 2021 05:04) (12 - 18)  SpO2: 100% (03 Sep 2021 05:04) (96% - 100%)    General: NAD, resting comforatbly in bed    Dressing C/D/I  2+ DP Pulses BL    Motor:                   L2                  L3             L4              L5            S1  R            5/5                5/5             5/5            5/5          5/5  L             4/5                4/5            4/5            4/5          5/5    Sensory:               L2          L3         L4      L5       S1         (0=absent, 1=impaired, 2=normal, NT=not testable)  R         2            2            2        2        2  L          2            2           2        2         2        A/P:  31y m s/p L5-S1 TLFI POD 1:  -PT/OT -WBAT  -Pain Control  -No Chemical DVT ppx  -SCDs  -LSO @ bedside, LSO when OOB  -Continue perioperative abx x 24 hours  -FU AM Labs  -Incentive Spirometry  -Medical management appreciated  -Dispo planning

## 2021-09-04 LAB
ANION GAP SERPL CALC-SCNC: 4 MMOL/L — LOW (ref 5–17)
BASOPHILS # BLD AUTO: 0.03 K/UL — SIGNIFICANT CHANGE UP (ref 0–0.2)
BASOPHILS NFR BLD AUTO: 0.3 % — SIGNIFICANT CHANGE UP (ref 0–2)
BUN SERPL-MCNC: 15 MG/DL — SIGNIFICANT CHANGE UP (ref 7–23)
CALCIUM SERPL-MCNC: 8.4 MG/DL — LOW (ref 8.5–10.1)
CHLORIDE SERPL-SCNC: 108 MMOL/L — SIGNIFICANT CHANGE UP (ref 96–108)
CO2 SERPL-SCNC: 26 MMOL/L — SIGNIFICANT CHANGE UP (ref 22–31)
CREAT SERPL-MCNC: 0.75 MG/DL — SIGNIFICANT CHANGE UP (ref 0.5–1.3)
EOSINOPHIL # BLD AUTO: 0.15 K/UL — SIGNIFICANT CHANGE UP (ref 0–0.5)
EOSINOPHIL NFR BLD AUTO: 1.6 % — SIGNIFICANT CHANGE UP (ref 0–6)
GLUCOSE SERPL-MCNC: 70 MG/DL — SIGNIFICANT CHANGE UP (ref 70–99)
HCT VFR BLD CALC: 35.7 % — LOW (ref 39–50)
HGB BLD-MCNC: 12.2 G/DL — LOW (ref 13–17)
IMM GRANULOCYTES NFR BLD AUTO: 0.3 % — SIGNIFICANT CHANGE UP (ref 0–1.5)
LYMPHOCYTES # BLD AUTO: 2.44 K/UL — SIGNIFICANT CHANGE UP (ref 1–3.3)
LYMPHOCYTES # BLD AUTO: 25.7 % — SIGNIFICANT CHANGE UP (ref 13–44)
MCHC RBC-ENTMCNC: 30.6 PG — SIGNIFICANT CHANGE UP (ref 27–34)
MCHC RBC-ENTMCNC: 34.2 GM/DL — SIGNIFICANT CHANGE UP (ref 32–36)
MCV RBC AUTO: 89.5 FL — SIGNIFICANT CHANGE UP (ref 80–100)
MONOCYTES # BLD AUTO: 1.18 K/UL — HIGH (ref 0–0.9)
MONOCYTES NFR BLD AUTO: 12.4 % — SIGNIFICANT CHANGE UP (ref 2–14)
NEUTROPHILS # BLD AUTO: 5.68 K/UL — SIGNIFICANT CHANGE UP (ref 1.8–7.4)
NEUTROPHILS NFR BLD AUTO: 59.7 % — SIGNIFICANT CHANGE UP (ref 43–77)
PLATELET # BLD AUTO: 171 K/UL — SIGNIFICANT CHANGE UP (ref 150–400)
POTASSIUM SERPL-MCNC: 3.9 MMOL/L — SIGNIFICANT CHANGE UP (ref 3.5–5.3)
POTASSIUM SERPL-SCNC: 3.9 MMOL/L — SIGNIFICANT CHANGE UP (ref 3.5–5.3)
RBC # BLD: 3.99 M/UL — LOW (ref 4.2–5.8)
RBC # FLD: 13.1 % — SIGNIFICANT CHANGE UP (ref 10.3–14.5)
SODIUM SERPL-SCNC: 138 MMOL/L — SIGNIFICANT CHANGE UP (ref 135–145)
WBC # BLD: 9.51 K/UL — SIGNIFICANT CHANGE UP (ref 3.8–10.5)
WBC # FLD AUTO: 9.51 K/UL — SIGNIFICANT CHANGE UP (ref 3.8–10.5)

## 2021-09-04 PROCEDURE — 99232 SBSQ HOSP IP/OBS MODERATE 35: CPT

## 2021-09-04 RX ORDER — TIZANIDINE 4 MG/1
2 TABLET ORAL
Refills: 0 | Status: DISCONTINUED | OUTPATIENT
Start: 2021-09-04 | End: 2021-09-05

## 2021-09-04 RX ADMIN — BUPROPION HYDROCHLORIDE 150 MILLIGRAM(S): 150 TABLET, EXTENDED RELEASE ORAL at 10:49

## 2021-09-04 RX ADMIN — HYDROMORPHONE HYDROCHLORIDE 0.5 MILLIGRAM(S): 2 INJECTION INTRAMUSCULAR; INTRAVENOUS; SUBCUTANEOUS at 11:18

## 2021-09-04 RX ADMIN — HYDROMORPHONE HYDROCHLORIDE 0.5 MILLIGRAM(S): 2 INJECTION INTRAMUSCULAR; INTRAVENOUS; SUBCUTANEOUS at 10:48

## 2021-09-04 RX ADMIN — OXYCODONE HYDROCHLORIDE 10 MILLIGRAM(S): 5 TABLET ORAL at 18:27

## 2021-09-04 RX ADMIN — Medication 20 MILLIGRAM(S): at 10:49

## 2021-09-04 RX ADMIN — Medication 975 MILLIGRAM(S): at 19:21

## 2021-09-04 RX ADMIN — OXYCODONE HYDROCHLORIDE 10 MILLIGRAM(S): 5 TABLET ORAL at 13:36

## 2021-09-04 RX ADMIN — TIZANIDINE 2 MILLIGRAM(S): 4 TABLET ORAL at 21:20

## 2021-09-04 RX ADMIN — CELECOXIB 200 MILLIGRAM(S): 200 CAPSULE ORAL at 21:19

## 2021-09-04 RX ADMIN — CELECOXIB 200 MILLIGRAM(S): 200 CAPSULE ORAL at 21:21

## 2021-09-04 RX ADMIN — Medication 25 MILLIGRAM(S): at 22:33

## 2021-09-04 RX ADMIN — CYCLOBENZAPRINE HYDROCHLORIDE 10 MILLIGRAM(S): 10 TABLET, FILM COATED ORAL at 04:58

## 2021-09-04 RX ADMIN — Medication 975 MILLIGRAM(S): at 18:35

## 2021-09-04 RX ADMIN — HYDROMORPHONE HYDROCHLORIDE 0.5 MILLIGRAM(S): 2 INJECTION INTRAMUSCULAR; INTRAVENOUS; SUBCUTANEOUS at 06:40

## 2021-09-04 RX ADMIN — HYDROMORPHONE HYDROCHLORIDE 0.5 MILLIGRAM(S): 2 INJECTION INTRAMUSCULAR; INTRAVENOUS; SUBCUTANEOUS at 02:03

## 2021-09-04 RX ADMIN — OXYCODONE HYDROCHLORIDE 10 MILLIGRAM(S): 5 TABLET ORAL at 09:49

## 2021-09-04 RX ADMIN — OXYCODONE HYDROCHLORIDE 10 MILLIGRAM(S): 5 TABLET ORAL at 05:27

## 2021-09-04 RX ADMIN — HYDROMORPHONE HYDROCHLORIDE 0.5 MILLIGRAM(S): 2 INJECTION INTRAMUSCULAR; INTRAVENOUS; SUBCUTANEOUS at 06:10

## 2021-09-04 RX ADMIN — OXYCODONE HYDROCHLORIDE 10 MILLIGRAM(S): 5 TABLET ORAL at 08:49

## 2021-09-04 RX ADMIN — OXYCODONE HYDROCHLORIDE 10 MILLIGRAM(S): 5 TABLET ORAL at 19:21

## 2021-09-04 RX ADMIN — HYDROMORPHONE HYDROCHLORIDE 0.5 MILLIGRAM(S): 2 INJECTION INTRAMUSCULAR; INTRAVENOUS; SUBCUTANEOUS at 20:12

## 2021-09-04 RX ADMIN — HYDROMORPHONE HYDROCHLORIDE 0.5 MILLIGRAM(S): 2 INJECTION INTRAMUSCULAR; INTRAVENOUS; SUBCUTANEOUS at 15:52

## 2021-09-04 RX ADMIN — TIZANIDINE 2 MILLIGRAM(S): 4 TABLET ORAL at 13:37

## 2021-09-04 RX ADMIN — CELECOXIB 200 MILLIGRAM(S): 200 CAPSULE ORAL at 10:49

## 2021-09-04 RX ADMIN — HYDROMORPHONE HYDROCHLORIDE 0.5 MILLIGRAM(S): 2 INJECTION INTRAMUSCULAR; INTRAVENOUS; SUBCUTANEOUS at 16:22

## 2021-09-04 RX ADMIN — GABAPENTIN 300 MILLIGRAM(S): 400 CAPSULE ORAL at 04:58

## 2021-09-04 RX ADMIN — HYDROMORPHONE HYDROCHLORIDE 0.5 MILLIGRAM(S): 2 INJECTION INTRAMUSCULAR; INTRAVENOUS; SUBCUTANEOUS at 20:42

## 2021-09-04 RX ADMIN — OXYCODONE HYDROCHLORIDE 10 MILLIGRAM(S): 5 TABLET ORAL at 22:33

## 2021-09-04 RX ADMIN — PANTOPRAZOLE SODIUM 40 MILLIGRAM(S): 20 TABLET, DELAYED RELEASE ORAL at 10:49

## 2021-09-04 RX ADMIN — GABAPENTIN 300 MILLIGRAM(S): 400 CAPSULE ORAL at 21:20

## 2021-09-04 RX ADMIN — OXYCODONE HYDROCHLORIDE 10 MILLIGRAM(S): 5 TABLET ORAL at 14:36

## 2021-09-04 RX ADMIN — HYDROMORPHONE HYDROCHLORIDE 0.5 MILLIGRAM(S): 2 INJECTION INTRAMUSCULAR; INTRAVENOUS; SUBCUTANEOUS at 02:33

## 2021-09-04 RX ADMIN — OXYCODONE HYDROCHLORIDE 10 MILLIGRAM(S): 5 TABLET ORAL at 04:57

## 2021-09-04 RX ADMIN — GABAPENTIN 300 MILLIGRAM(S): 400 CAPSULE ORAL at 13:36

## 2021-09-04 RX ADMIN — OXYCODONE HYDROCHLORIDE 10 MILLIGRAM(S): 5 TABLET ORAL at 23:03

## 2021-09-04 NOTE — PROGRESS NOTE ADULT - SUBJECTIVE AND OBJECTIVE BOX
C/C: back pain    HPI: 30 y/o M w/ PMH of sleep apnea (resolved since weight loss), GERD, L5-S1 microdiscectomy 7/21 , p/w back pain and LLE radiculopathy.  Patient states that he developed sudden onset back pain and LLE numbness reaching for his phone . States he has back pain when he ambulates, and it improves with rest. He was admitted with reherniation of his lumbar disc. Now s/p L5-S1 TLIF.   PT seen and examined this am. Doign well. ambulate with physical therapy. was able to do a few steps. no bm yet. no sob/chest pain. no n/v/abd pain. tolerating po.     ROS: all 10 systems reviewed and is as above otherwise negative.     VSS as charted    PHYSICAL EXAM:    GENERAL: Comfortable, no acute distress   HEAD:  Normocephalic, atraumatic  EYES: EOMI, PERRLA  HEENT: Moist mucous membranes  NECK: Supple, No JVD  NERVOUS SYSTEM:  Alert & Oriented X3, Motor Strength 5/5 B/L upper and lower extremities  CHEST/LUNG: Clear to auscultation bilaterally  HEART: Regular rate and rhythm  ABDOMEN: Soft, non tender, Nondistended, Bowel sounds present  GENITOURINARY: Voiding, no palpable bladder  EXTREMITIES:   No clubbing, cyanosis, or edema  MUSCULOSKELETAL-lumbar brace in place  SKIN-no rash    LABS: reviewed    EXAM:  XR CHEST PA LAT 2V                        PROCEDURE DATE:  08/31/2021    INTERPRETATION:  Chest 2 views  HISTORY: Preop  COMPARISON: 7/1/2021  Frontal and lateral views of the chest were obtained with suboptimal inspiration. With allowance for this, the heart is normal in size and the lungs are clear. There is no evidence of pneumothorax nor pleural effusion.  IMPRESSION: No active pulmonary disease.          Xray Lumbosacral Spine (08.31.21 @ 16:38) >  EXAM:  XR LS SPINE AP LAT 2-3 VIEWS                        PROCEDURE DATE:  08/31/2021    INTERPRETATION:  Lumbar spine:  HISTORY: Low back pain  COMPARISON: 8/23/2010  Three views of the lumbar spine reveal normal alignment and curvature in the lateral view. Mild levoscoliosis is seen in the frontal view.  The vertebral bodies are satisfactory in height with no evidence of recent or old compression fracture. The pedicles, spinous processes and transverse processes and sacroiliac joints with similar accessory SI joints appear normal. The disc spaces show mild narrowing of L5-S1.  IMPRESSION:  No acute bony pathology of the lumbosacral spine.       MR Lumbar Spine w/wo IV Cont (08.30.21 @ 23:05) >  EXAM:  MR SPINE LUMBAR WAW IC                        PROCEDURE DATE:  08/30/2021    INTERPRETATION:  Trey ELIZONDO MD, have reviewed the images and the report and agree with the findings, with the following modifications:  IV contrast: 7.5 mL IV Gadavist.  Agree that the patient is status post left hemilaminectomy at L5-S1 with associated postsurgical changes. There is enhancement within the dorsal subcutaneous soft tissues and paraspinal muscles extending into the laminectomy bed without evidence of drainable fluid collection. Agree that there is enhancement within the left lateral common dorsal and ventral epidural space at the L5-S1 level which may be secondary to postsurgical changes although phlegmon/infection can have a similar appearance and clinical correlation is recommended. No evidence of drainable fluid collection within the epidural space.  Agree that there is a left lateral recess/foraminal disc herniation at L5-S1 with mass effect on the descending left S1 and exiting left L5 nerve roots.  Additional findings:  There is dextroscoliosis of the thoracolumbar junction. The vertebral body heights are maintained. There is trace retrolisthesis of L5 on S1. There is disc desiccation and disc space narrowing at L5-S1. The conus medullaris terminates at the L1-L2 level and is normal signal and morphology.  At L1-L2 there is a disc bulge without stenotic and spinal canal stenosis or neural foraminal narrowing.  At L4-L5 there is a disc bulge with facet hypertrophy with mild spinal canal stenosis and bilateral neural foraminal narrowing.                   MEDICATIONS  (STANDING):  buPROPion XL (24-Hour) . 150 milliGRAM(s) Oral daily  celecoxib 200 milliGRAM(s) Oral every 12 hours  cyclobenzaprine 10 milliGRAM(s) Oral every 8 hours  FLUoxetine 20 milliGRAM(s) Oral daily  gabapentin 300 milliGRAM(s) Oral every 8 hours  lactated ringers. 1000 milliLiter(s) (75 mL/Hr) IV Continuous <Continuous>  pantoprazole    Tablet 40 milliGRAM(s) Oral before breakfast  senna 2 Tablet(s) Oral at bedtime    MEDICATIONS  (PRN):  acetaminophen   Tablet .. 650 milliGRAM(s) Oral every 6 hours PRN Temp greater or equal to 38C (100.4F)  acetaminophen   Tablet .. 975 milliGRAM(s) Oral every 6 hours PRN Temp greater or equal to 38C (100.4F), Mild Pain (1 - 3)  diphenhydrAMINE 25 milliGRAM(s) Oral every 4 hours PRN Insomnia  diphenhydrAMINE   Injectable 12.5 milliGRAM(s) IV Push every 4 hours PRN Itching  HYDROmorphone  Injectable 0.5 milliGRAM(s) SubCutaneous every 4 hours PRN Severe Pain (7 - 10)  magnesium hydroxide Suspension 30 milliLiter(s) Oral every 12 hours PRN Constipation  ondansetron Injectable 4 milliGRAM(s) IV Push every 6 hours PRN Nausea  oxyCODONE    IR 5 milliGRAM(s) Oral every 4 hours PRN Moderate Pain (4 - 6)  oxyCODONE    IR 10 milliGRAM(s) Oral every 4 hours PRN Severe Pain (7 - 10)  prochlorperazine   Injectable 10 milliGRAM(s) IV Push once PRN Nausea not relieved by Zofran  traMADol 50 milliGRAM(s) Oral every 6 hours PRN Mild Pain (1 - 3)              ASSESSMENT AND PLAN:  30 y/o M w/  above PMH a/w:    #Recurrent lumbar disc herniation:  -s/p TLIF L5-S1  -pain control with prn oxycodone/tramadol  -flexeril for muscle spasms  -physical therapy  -incentive spirometry  -bowel regimen.     #GERD  -PPI    #Anxiety/Depression:  -continue bupropion/fluoxetine.    *DVT ppx  amb  venodynes

## 2021-09-04 NOTE — PROGRESS NOTE ADULT - SUBJECTIVE AND OBJECTIVE BOX
POD#2 s/p Left L5-S1 revision discectomy, MAS-TLIF. Pt seen resting on chair. Pt c/o incisional site soreness and slow to mobilize with physical therapy. Pt states he had some difficulty with physical therapy sessions with stairs mainly. Pain is tolerable with current meds.  Pt denies lower extremities pain, numbness, and weakness. Voided on own without complications. Passing flatus.    PE  Gen appearance: NAD  Motor strength: 5/5 of b/l HF, quads, gastroc. 5/5 of the right EHL and ant tib. 4/5 of the left ant tib and EHL.  Sensation: intact to light touch bilat  No calf tenderness bilat. Venodynes on.   Incisional site: Dry dressing intact of the posterior incisional sites.    Plan  Afeb, HR:102, BP:121/59 monitor at this time.  WBC: wnl, H/H: 12.2/35.7  Continue physical therapy and encouraged incentive spirometer. Continue analgesics.   Refuses rehab. Consider DC to home tomorrow.

## 2021-09-04 NOTE — CHART NOTE - NSCHARTNOTEFT_GEN_A_CORE
Patient Tyson Ivey was fit and delivered a LSO with rigid anterior posterior and lateral panels. Tyson was educated on the care use and function of the orthosis. I measured and fit patient in Pre-op area and brace was brought back to his room on the second floor. Contact info was given to patient. All went without incident.   Cameron Prajapati [facility]  [phone]
Nurse Mccrary called states patient takes Zanaflex 2mg BID at home which helps with the pain and would like to try Zanaflex instead of cyclobenzaprine. Called Pharmacy and ordered.   Discontinued Cyclobenzaprine.

## 2021-09-05 LAB
ANION GAP SERPL CALC-SCNC: 1 MMOL/L — LOW (ref 5–17)
BASOPHILS # BLD AUTO: 0.03 K/UL — SIGNIFICANT CHANGE UP (ref 0–0.2)
BASOPHILS NFR BLD AUTO: 0.4 % — SIGNIFICANT CHANGE UP (ref 0–2)
BUN SERPL-MCNC: 16 MG/DL — SIGNIFICANT CHANGE UP (ref 7–23)
CALCIUM SERPL-MCNC: 8.5 MG/DL — SIGNIFICANT CHANGE UP (ref 8.5–10.1)
CHLORIDE SERPL-SCNC: 107 MMOL/L — SIGNIFICANT CHANGE UP (ref 96–108)
CO2 SERPL-SCNC: 30 MMOL/L — SIGNIFICANT CHANGE UP (ref 22–31)
CREAT SERPL-MCNC: 0.84 MG/DL — SIGNIFICANT CHANGE UP (ref 0.5–1.3)
EOSINOPHIL # BLD AUTO: 0.19 K/UL — SIGNIFICANT CHANGE UP (ref 0–0.5)
EOSINOPHIL NFR BLD AUTO: 2.5 % — SIGNIFICANT CHANGE UP (ref 0–6)
GLUCOSE SERPL-MCNC: 74 MG/DL — SIGNIFICANT CHANGE UP (ref 70–99)
HCT VFR BLD CALC: 33.7 % — LOW (ref 39–50)
HGB BLD-MCNC: 11.3 G/DL — LOW (ref 13–17)
IMM GRANULOCYTES NFR BLD AUTO: 0.7 % — SIGNIFICANT CHANGE UP (ref 0–1.5)
LYMPHOCYTES # BLD AUTO: 2.44 K/UL — SIGNIFICANT CHANGE UP (ref 1–3.3)
LYMPHOCYTES # BLD AUTO: 32.4 % — SIGNIFICANT CHANGE UP (ref 13–44)
MCHC RBC-ENTMCNC: 30.1 PG — SIGNIFICANT CHANGE UP (ref 27–34)
MCHC RBC-ENTMCNC: 33.5 GM/DL — SIGNIFICANT CHANGE UP (ref 32–36)
MCV RBC AUTO: 89.6 FL — SIGNIFICANT CHANGE UP (ref 80–100)
MONOCYTES # BLD AUTO: 0.95 K/UL — HIGH (ref 0–0.9)
MONOCYTES NFR BLD AUTO: 12.6 % — SIGNIFICANT CHANGE UP (ref 2–14)
NEUTROPHILS # BLD AUTO: 3.88 K/UL — SIGNIFICANT CHANGE UP (ref 1.8–7.4)
NEUTROPHILS NFR BLD AUTO: 51.4 % — SIGNIFICANT CHANGE UP (ref 43–77)
PLATELET # BLD AUTO: 172 K/UL — SIGNIFICANT CHANGE UP (ref 150–400)
POTASSIUM SERPL-MCNC: 4 MMOL/L — SIGNIFICANT CHANGE UP (ref 3.5–5.3)
POTASSIUM SERPL-SCNC: 4 MMOL/L — SIGNIFICANT CHANGE UP (ref 3.5–5.3)
RBC # BLD: 3.76 M/UL — LOW (ref 4.2–5.8)
RBC # FLD: 13 % — SIGNIFICANT CHANGE UP (ref 10.3–14.5)
SODIUM SERPL-SCNC: 138 MMOL/L — SIGNIFICANT CHANGE UP (ref 135–145)
WBC # BLD: 7.54 K/UL — SIGNIFICANT CHANGE UP (ref 3.8–10.5)
WBC # FLD AUTO: 7.54 K/UL — SIGNIFICANT CHANGE UP (ref 3.8–10.5)

## 2021-09-05 PROCEDURE — 99232 SBSQ HOSP IP/OBS MODERATE 35: CPT

## 2021-09-05 RX ORDER — ACETAMINOPHEN 500 MG
1000 TABLET ORAL ONCE
Refills: 0 | Status: COMPLETED | OUTPATIENT
Start: 2021-09-05 | End: 2021-09-05

## 2021-09-05 RX ORDER — TIZANIDINE 4 MG/1
4 TABLET ORAL THREE TIMES A DAY
Refills: 0 | Status: DISCONTINUED | OUTPATIENT
Start: 2021-09-05 | End: 2021-09-06

## 2021-09-05 RX ORDER — POLYETHYLENE GLYCOL 3350 17 G/17G
17 POWDER, FOR SOLUTION ORAL DAILY
Refills: 0 | Status: DISCONTINUED | OUTPATIENT
Start: 2021-09-05 | End: 2021-09-06

## 2021-09-05 RX ADMIN — HYDROMORPHONE HYDROCHLORIDE 0.5 MILLIGRAM(S): 2 INJECTION INTRAMUSCULAR; INTRAVENOUS; SUBCUTANEOUS at 00:25

## 2021-09-05 RX ADMIN — GABAPENTIN 300 MILLIGRAM(S): 400 CAPSULE ORAL at 04:55

## 2021-09-05 RX ADMIN — CELECOXIB 200 MILLIGRAM(S): 200 CAPSULE ORAL at 07:50

## 2021-09-05 RX ADMIN — TIZANIDINE 4 MILLIGRAM(S): 4 TABLET ORAL at 13:18

## 2021-09-05 RX ADMIN — CELECOXIB 200 MILLIGRAM(S): 200 CAPSULE ORAL at 21:15

## 2021-09-05 RX ADMIN — BUPROPION HYDROCHLORIDE 150 MILLIGRAM(S): 150 TABLET, EXTENDED RELEASE ORAL at 09:47

## 2021-09-05 RX ADMIN — OXYCODONE HYDROCHLORIDE 10 MILLIGRAM(S): 5 TABLET ORAL at 08:20

## 2021-09-05 RX ADMIN — HYDROMORPHONE HYDROCHLORIDE 0.5 MILLIGRAM(S): 2 INJECTION INTRAMUSCULAR; INTRAVENOUS; SUBCUTANEOUS at 05:24

## 2021-09-05 RX ADMIN — HYDROMORPHONE HYDROCHLORIDE 0.5 MILLIGRAM(S): 2 INJECTION INTRAMUSCULAR; INTRAVENOUS; SUBCUTANEOUS at 00:55

## 2021-09-05 RX ADMIN — OXYCODONE HYDROCHLORIDE 10 MILLIGRAM(S): 5 TABLET ORAL at 16:55

## 2021-09-05 RX ADMIN — OXYCODONE HYDROCHLORIDE 10 MILLIGRAM(S): 5 TABLET ORAL at 07:49

## 2021-09-05 RX ADMIN — OXYCODONE HYDROCHLORIDE 10 MILLIGRAM(S): 5 TABLET ORAL at 13:00

## 2021-09-05 RX ADMIN — TRAMADOL HYDROCHLORIDE 50 MILLIGRAM(S): 50 TABLET ORAL at 18:55

## 2021-09-05 RX ADMIN — MAGNESIUM HYDROXIDE 30 MILLILITER(S): 400 TABLET, CHEWABLE ORAL at 09:47

## 2021-09-05 RX ADMIN — OXYCODONE HYDROCHLORIDE 10 MILLIGRAM(S): 5 TABLET ORAL at 12:27

## 2021-09-05 RX ADMIN — Medication 20 MILLIGRAM(S): at 09:47

## 2021-09-05 RX ADMIN — TIZANIDINE 2 MILLIGRAM(S): 4 TABLET ORAL at 09:47

## 2021-09-05 RX ADMIN — OXYCODONE HYDROCHLORIDE 10 MILLIGRAM(S): 5 TABLET ORAL at 20:03

## 2021-09-05 RX ADMIN — OXYCODONE HYDROCHLORIDE 10 MILLIGRAM(S): 5 TABLET ORAL at 20:33

## 2021-09-05 RX ADMIN — GABAPENTIN 300 MILLIGRAM(S): 400 CAPSULE ORAL at 21:15

## 2021-09-05 RX ADMIN — TRAMADOL HYDROCHLORIDE 50 MILLIGRAM(S): 50 TABLET ORAL at 18:23

## 2021-09-05 RX ADMIN — OXYCODONE HYDROCHLORIDE 10 MILLIGRAM(S): 5 TABLET ORAL at 16:25

## 2021-09-05 RX ADMIN — Medication 400 MILLIGRAM(S): at 03:54

## 2021-09-05 RX ADMIN — GABAPENTIN 300 MILLIGRAM(S): 400 CAPSULE ORAL at 13:18

## 2021-09-05 RX ADMIN — Medication 1000 MILLIGRAM(S): at 03:54

## 2021-09-05 RX ADMIN — PANTOPRAZOLE SODIUM 40 MILLIGRAM(S): 20 TABLET, DELAYED RELEASE ORAL at 07:50

## 2021-09-05 RX ADMIN — HYDROMORPHONE HYDROCHLORIDE 0.5 MILLIGRAM(S): 2 INJECTION INTRAMUSCULAR; INTRAVENOUS; SUBCUTANEOUS at 04:54

## 2021-09-05 RX ADMIN — TIZANIDINE 4 MILLIGRAM(S): 4 TABLET ORAL at 22:23

## 2021-09-05 NOTE — PROGRESS NOTE ADULT - SUBJECTIVE AND OBJECTIVE BOX
C/C: back pain    HPI: 32 y/o M w/ PMH of sleep apnea (resolved since weight loss), GERD, L5-S1 microdiscectomy 7/21 , p/w back pain and LLE radiculopathy.  Patient states that he developed sudden onset back pain and LLE numbness reaching for his phone . States he has back pain when he ambulates, and it improves with rest. He was admitted with reherniation of his lumbar disc. Now s/p L5-S1 TLIF.     9/5: Pt seen and examined on 2N. OOB to chair, Doing well. Having some muscle spasms and requesting that the zanaflex be increased, amb with walker without assistno sob/chest pain. tolerating po. no difficulty voiding. no n/v. no bm yet.     ROS: all 10 systems reviewed and is as above otherwise negative.       Vital Signs Last 24 Hrs  T(C): 36.6 (09-05-21 @ 09:40), Max: 36.6 (09-04-21 @ 23:38)  T(F): 97.8 (09-05-21 @ 09:40), Max: 97.8 (09-04-21 @ 23:38)  HR: 87 (09-05-21 @ 09:40) (87 - 103)  BP: 126/62 (09-05-21 @ 09:40) (98/43 - 126/62)  BP(mean): 75 (09-05-21 @ 09:40) (75 - 75)  RR: 16 (09-05-21 @ 09:40) (16 - 16)  SpO2: 100% (09-05-21 @ 09:40) (97% - 100%)      PHYSICAL EXAM:    GENERAL: Comfortable, no acute distress   HEAD:  Normocephalic, atraumatic  EYES: EOMI, PERRLA  HEENT: Moist mucous membranes  NECK: Supple, No JVD  NERVOUS SYSTEM:  Alert & Oriented X3, Motor Strength 5/5 B/L upper and lower extremities  CHEST/LUNG: Clear to auscultation bilaterally  HEART: Regular rate and rhythm  ABDOMEN: Soft, non tender, Nondistended, Bowel sounds present  GENITOURINARY: Voiding, no palpable bladder  EXTREMITIES:   No clubbing, cyanosis, or edema  MUSCULOSKELETAL-lumbar brace in place  SKIN-no rash    LABS:                                         11.3   7.54  )-----------( 172      ( 05 Sep 2021 08:17 )             33.7       09-05    138  |  107  |  16  ----------------------------<  74  4.0   |  30  |  0.84    Ca    8.5      05 Sep 2021 08:17           Xray Chest 2 Views PA/Lat (08.31.21 @ 16:39) >    EXAM:  XR CHEST PA LAT 2V                        PROCEDURE DATE:  08/31/2021    INTERPRETATION:  Chest 2 views  HISTORY: Preop  COMPARISON: 7/1/2021  Frontal and lateral views of the chest were obtained with suboptimal inspiration. With allowance for this, the heart is normal in size and the lungs are clear. There is no evidence of pneumothorax nor pleural effusion.  IMPRESSION: No active pulmonary disease.          Xray Lumbosacral Spine (08.31.21 @ 16:38) >  EXAM:  XR LS SPINE AP LAT 2-3 VIEWS                        PROCEDURE DATE:  08/31/2021    INTERPRETATION:  Lumbar spine:  HISTORY: Low back pain  COMPARISON: 8/23/2010  Three views of the lumbar spine reveal normal alignment and curvature in the lateral view. Mild levoscoliosis is seen in the frontal view.  The vertebral bodies are satisfactory in height with no evidence of recent or old compression fracture. The pedicles, spinous processes and transverse processes and sacroiliac joints with similar accessory SI joints appear normal. The disc spaces show mild narrowing of L5-S1.  IMPRESSION:  No acute bony pathology of the lumbosacral spine.       MR Lumbar Spine w/wo IV Cont (08.30.21 @ 23:05) >  EXAM:  MR SPINE LUMBAR WAW IC                        PROCEDURE DATE:  08/30/2021    INTERPRETATION:  Trey ELIZONDO MD, have reviewed the images and the report and agree with the findings, with the following modifications:  IV contrast: 7.5 mL IV Gadavist.  Agree that the patient is status post left hemilaminectomy at L5-S1 with associated postsurgical changes. There is enhancement within the dorsal subcutaneous soft tissues and paraspinal muscles extending into the laminectomy bed without evidence of drainable fluid collection. Agree that there is enhancement within the left lateral common dorsal and ventral epidural space at the L5-S1 level which may be secondary to postsurgical changes although phlegmon/infection can have a similar appearance and clinical correlation is recommended. No evidence of drainable fluid collection within the epidural space.  Agree that there is a left lateral recess/foraminal disc herniation at L5-S1 with mass effect on the descending left S1 and exiting left L5 nerve roots.  Additional findings:  There is dextroscoliosis of the thoracolumbar junction. The vertebral body heights are maintained. There is trace retrolisthesis of L5 on S1. There is disc desiccation and disc space narrowing at L5-S1. The conus medullaris terminates at the L1-L2 level and is normal signal and morphology.  At L1-L2 there is a disc bulge without stenotic and spinal canal stenosis or neural foraminal narrowing.  At L4-L5 there is a disc bulge with facet hypertrophy with mild spinal canal stenosis and bilateral neural foraminal narrowing.        MEDICATIONS  (STANDING):  buPROPion XL (24-Hour) . 150 milliGRAM(s) Oral daily  celecoxib 200 milliGRAM(s) Oral every 12 hours  FLUoxetine 20 milliGRAM(s) Oral daily  gabapentin 300 milliGRAM(s) Oral every 8 hours  lactated ringers. 1000 milliLiter(s) (75 mL/Hr) IV Continuous <Continuous>  pantoprazole    Tablet 40 milliGRAM(s) Oral before breakfast  polyethylene glycol 3350 17 Gram(s) Oral daily  senna 2 Tablet(s) Oral at bedtime  tiZANidine 4 milliGRAM(s) Oral three times a day    MEDICATIONS  (PRN):  acetaminophen   Tablet .. 650 milliGRAM(s) Oral every 6 hours PRN Temp greater or equal to 38C (100.4F)  acetaminophen   Tablet .. 975 milliGRAM(s) Oral every 6 hours PRN Temp greater or equal to 38C (100.4F), Mild Pain (1 - 3)  diphenhydrAMINE 25 milliGRAM(s) Oral every 4 hours PRN Insomnia  diphenhydrAMINE   Injectable 12.5 milliGRAM(s) IV Push every 4 hours PRN Itching  HYDROmorphone  Injectable 0.5 milliGRAM(s) SubCutaneous every 4 hours PRN Severe Pain (7 - 10)  magnesium hydroxide Suspension 30 milliLiter(s) Oral every 12 hours PRN Constipation  ondansetron Injectable 4 milliGRAM(s) IV Push every 6 hours PRN Nausea  oxyCODONE    IR 5 milliGRAM(s) Oral every 4 hours PRN Moderate Pain (4 - 6)  oxyCODONE    IR 10 milliGRAM(s) Oral every 4 hours PRN Severe Pain (7 - 10)  prochlorperazine   Injectable 10 milliGRAM(s) IV Push once PRN Nausea not relieved by Zofran  traMADol 50 milliGRAM(s) Oral every 6 hours PRN Mild Pain (1 - 3)              ASSESSMENT AND PLAN:  32 y/o M w/  above PMH a/w:    #Recurrent lumbar disc herniation:  -s/p TLIF L5-S1 pod#3  -pain control wtih prn oxycodone/tramadol  -flexeril for muscle spasms  -physical therapy  -incentive spirometry  -no bowel regimen: received MOM, will add miralax.    #GERD  -PPI    #Anxiety/Depression:  -continue bupropion/fluoxetine.    *DVT ppx  amb  venodynes    dispo: home when ok with spine

## 2021-09-05 NOTE — PROGRESS NOTE ADULT - SUBJECTIVE AND OBJECTIVE BOX
POD#3. Pt seen resting on chair. Pt states he has spasms of the back. Pt denies lower extremities pain and numbness. Voided on own without complications.   Mobilized with physical therapy and was able to do stairs. He states Tizanidine 2mg BID does not help with the spasms.  Pt denies chest pain, SOB, vertigo.     PE  Gen appearance: NAD  motor strength: 5/5 of b/l quads, HF, gastrocs. 5/5 of the right ant tib and gastroc. 4/5 of the left ant tib and left gastroc  Sensation: intact to light touch bilat  No calf tenderness bilat   Incisional site: no active drainage. Dressing changed. Steristrips intact.    Plan  Afeb, VSS  WBC: wnl, H/H: 11.3/33.7  Mobilize with physical therapy and encouraged incentive spirometer.   Changed Tizanidine 2mg BID to Tizanidine 4mg TID. spoke to Pharmacy.  Discussed case with Dr. Ramires.

## 2021-09-06 ENCOUNTER — TRANSCRIPTION ENCOUNTER (OUTPATIENT)
Age: 31
End: 2021-09-06

## 2021-09-06 ENCOUNTER — INPATIENT (INPATIENT)
Facility: HOSPITAL | Age: 31
LOS: 2 days | Discharge: HOME | DRG: 552 | End: 2021-09-09
Attending: ORTHOPAEDIC SURGERY | Admitting: ORTHOPAEDIC SURGERY
Payer: COMMERCIAL

## 2021-09-06 VITALS
OXYGEN SATURATION: 100 % | HEIGHT: 70 IN | SYSTOLIC BLOOD PRESSURE: 136 MMHG | RESPIRATION RATE: 18 BRPM | HEART RATE: 100 BPM | WEIGHT: 285.06 LBS | TEMPERATURE: 100 F | DIASTOLIC BLOOD PRESSURE: 71 MMHG

## 2021-09-06 VITALS
SYSTOLIC BLOOD PRESSURE: 109 MMHG | HEART RATE: 72 BPM | RESPIRATION RATE: 16 BRPM | DIASTOLIC BLOOD PRESSURE: 58 MMHG | OXYGEN SATURATION: 100 % | TEMPERATURE: 99 F

## 2021-09-06 DIAGNOSIS — Z98.84 BARIATRIC SURGERY STATUS: Chronic | ICD-10-CM

## 2021-09-06 DIAGNOSIS — Z98.89 OTHER SPECIFIED POSTPROCEDURAL STATES: Chronic | ICD-10-CM

## 2021-09-06 LAB
BASOPHILS # BLD AUTO: 0.03 K/UL — SIGNIFICANT CHANGE UP (ref 0–0.2)
BASOPHILS NFR BLD AUTO: 0.4 % — SIGNIFICANT CHANGE UP (ref 0–2)
EOSINOPHIL # BLD AUTO: 0.16 K/UL — SIGNIFICANT CHANGE UP (ref 0–0.5)
EOSINOPHIL NFR BLD AUTO: 2 % — SIGNIFICANT CHANGE UP (ref 0–6)
HCT VFR BLD CALC: 33.5 % — LOW (ref 39–50)
HCT VFR BLD CALC: 34.6 % — LOW (ref 39–50)
HGB BLD-MCNC: 11.6 G/DL — LOW (ref 13–17)
HGB BLD-MCNC: 11.7 G/DL — LOW (ref 13–17)
IMM GRANULOCYTES NFR BLD AUTO: 0.5 % — SIGNIFICANT CHANGE UP (ref 0–1.5)
LYMPHOCYTES # BLD AUTO: 2.42 K/UL — SIGNIFICANT CHANGE UP (ref 1–3.3)
LYMPHOCYTES # BLD AUTO: 30.2 % — SIGNIFICANT CHANGE UP (ref 13–44)
MCHC RBC-ENTMCNC: 30.1 PG — SIGNIFICANT CHANGE UP (ref 27–34)
MCHC RBC-ENTMCNC: 30.4 PG — SIGNIFICANT CHANGE UP (ref 27–34)
MCHC RBC-ENTMCNC: 33.8 GM/DL — SIGNIFICANT CHANGE UP (ref 32–36)
MCHC RBC-ENTMCNC: 34.6 GM/DL — SIGNIFICANT CHANGE UP (ref 32–36)
MCV RBC AUTO: 87.7 FL — SIGNIFICANT CHANGE UP (ref 80–100)
MCV RBC AUTO: 88.9 FL — SIGNIFICANT CHANGE UP (ref 80–100)
MONOCYTES # BLD AUTO: 0.77 K/UL — SIGNIFICANT CHANGE UP (ref 0–0.9)
MONOCYTES NFR BLD AUTO: 9.6 % — SIGNIFICANT CHANGE UP (ref 2–14)
NEUTROPHILS # BLD AUTO: 4.59 K/UL — SIGNIFICANT CHANGE UP (ref 1.8–7.4)
NEUTROPHILS NFR BLD AUTO: 57.3 % — SIGNIFICANT CHANGE UP (ref 43–77)
PLATELET # BLD AUTO: 178 K/UL — SIGNIFICANT CHANGE UP (ref 150–400)
PLATELET # BLD AUTO: 215 K/UL — SIGNIFICANT CHANGE UP (ref 150–400)
RBC # BLD: 3.82 M/UL — LOW (ref 4.2–5.8)
RBC # BLD: 3.89 M/UL — LOW (ref 4.2–5.8)
RBC # FLD: 12.5 % — SIGNIFICANT CHANGE UP (ref 10.3–14.5)
RBC # FLD: 13 % — SIGNIFICANT CHANGE UP (ref 10.3–14.5)
WBC # BLD: 7.36 K/UL — SIGNIFICANT CHANGE UP (ref 3.8–10.5)
WBC # BLD: 8.01 K/UL — SIGNIFICANT CHANGE UP (ref 3.8–10.5)
WBC # FLD AUTO: 7.36 K/UL — SIGNIFICANT CHANGE UP (ref 3.8–10.5)
WBC # FLD AUTO: 8.01 K/UL — SIGNIFICANT CHANGE UP (ref 3.8–10.5)

## 2021-09-06 PROCEDURE — 99285 EMERGENCY DEPT VISIT HI MDM: CPT

## 2021-09-06 PROCEDURE — 99232 SBSQ HOSP IP/OBS MODERATE 35: CPT

## 2021-09-06 RX ORDER — TIZANIDINE 4 MG/1
1 TABLET ORAL
Qty: 0 | Refills: 0 | DISCHARGE
Start: 2021-09-06

## 2021-09-06 RX ORDER — GABAPENTIN 400 MG/1
1 CAPSULE ORAL
Qty: 0 | Refills: 0 | DISCHARGE
Start: 2021-09-06

## 2021-09-06 RX ORDER — HYDROMORPHONE HYDROCHLORIDE 2 MG/ML
1 INJECTION INTRAMUSCULAR; INTRAVENOUS; SUBCUTANEOUS ONCE
Refills: 0 | Status: DISCONTINUED | OUTPATIENT
Start: 2021-09-06 | End: 2021-09-06

## 2021-09-06 RX ORDER — SODIUM CHLORIDE 9 MG/ML
1000 INJECTION INTRAMUSCULAR; INTRAVENOUS; SUBCUTANEOUS ONCE
Refills: 0 | Status: COMPLETED | OUTPATIENT
Start: 2021-09-06 | End: 2021-09-06

## 2021-09-06 RX ADMIN — Medication 25 MILLIGRAM(S): at 00:07

## 2021-09-06 RX ADMIN — OXYCODONE HYDROCHLORIDE 10 MILLIGRAM(S): 5 TABLET ORAL at 00:37

## 2021-09-06 RX ADMIN — GABAPENTIN 300 MILLIGRAM(S): 400 CAPSULE ORAL at 05:52

## 2021-09-06 RX ADMIN — OXYCODONE HYDROCHLORIDE 10 MILLIGRAM(S): 5 TABLET ORAL at 05:08

## 2021-09-06 RX ADMIN — CELECOXIB 200 MILLIGRAM(S): 200 CAPSULE ORAL at 08:42

## 2021-09-06 RX ADMIN — SODIUM CHLORIDE 1000 MILLILITER(S): 9 INJECTION INTRAMUSCULAR; INTRAVENOUS; SUBCUTANEOUS at 23:36

## 2021-09-06 RX ADMIN — HYDROMORPHONE HYDROCHLORIDE 1 MILLIGRAM(S): 2 INJECTION INTRAMUSCULAR; INTRAVENOUS; SUBCUTANEOUS at 23:36

## 2021-09-06 RX ADMIN — TRAMADOL HYDROCHLORIDE 50 MILLIGRAM(S): 50 TABLET ORAL at 03:22

## 2021-09-06 RX ADMIN — BUPROPION HYDROCHLORIDE 150 MILLIGRAM(S): 150 TABLET, EXTENDED RELEASE ORAL at 08:42

## 2021-09-06 RX ADMIN — TRAMADOL HYDROCHLORIDE 50 MILLIGRAM(S): 50 TABLET ORAL at 11:50

## 2021-09-06 RX ADMIN — PANTOPRAZOLE SODIUM 40 MILLIGRAM(S): 20 TABLET, DELAYED RELEASE ORAL at 08:43

## 2021-09-06 RX ADMIN — TIZANIDINE 4 MILLIGRAM(S): 4 TABLET ORAL at 05:52

## 2021-09-06 RX ADMIN — OXYCODONE HYDROCHLORIDE 10 MILLIGRAM(S): 5 TABLET ORAL at 04:38

## 2021-09-06 RX ADMIN — OXYCODONE HYDROCHLORIDE 10 MILLIGRAM(S): 5 TABLET ORAL at 08:43

## 2021-09-06 RX ADMIN — Medication 20 MILLIGRAM(S): at 08:42

## 2021-09-06 RX ADMIN — CELECOXIB 200 MILLIGRAM(S): 200 CAPSULE ORAL at 10:19

## 2021-09-06 RX ADMIN — OXYCODONE HYDROCHLORIDE 10 MILLIGRAM(S): 5 TABLET ORAL at 00:07

## 2021-09-06 RX ADMIN — TRAMADOL HYDROCHLORIDE 50 MILLIGRAM(S): 50 TABLET ORAL at 02:52

## 2021-09-06 NOTE — ED ADULT NURSE NOTE - CHIEF COMPLAINT QUOTE
pt had spinal surgery at [facility] 5 days ago. c/o worsening back pain started 3hour pta. Pt got himself back in bed and pain started + numbness on right leg. Denies new fall or injury. 20mg morphine IM given by EMS.

## 2021-09-06 NOTE — PROGRESS NOTE ADULT - REASON FOR ADMISSION
LLE radiculopathy

## 2021-09-06 NOTE — PROGRESS NOTE ADULT - PROVIDER SPECIALTY LIST ADULT
Hospitalist
Hospitalist
Orthopedics
Hospitalist
Hospitalist
Orthopedics
Hospitalist
Hospitalist
Orthopedics

## 2021-09-06 NOTE — ED PROVIDER NOTE - PROGRESS NOTE DETAILS
received call from Radha MARTINEZ and states XR LS and if unchanged and pain under control may d/c home with f/u with [facility].  if pain not under control to have ortho resident admit to [facility] pt with persistent pain despite multiple rounds of pain meds.   will admit.   case d/w ortho resident and will admit.

## 2021-09-06 NOTE — DISCHARGE NOTE PROVIDER - CARE PROVIDER_API CALL
Becky Tariq)  Orthopaedic Surgery  [address] [place] [address], [address] [place] [address]  Phone: [phone]  Fax: [phone]  Follow Up Time: 1 week

## 2021-09-06 NOTE — DISCHARGE NOTE PROVIDER - HOSPITAL COURSE
09/01/2021: 30 yo patient of [facility]  s/p Left L5-S1 microdisc  with recipient sciatica, numbness weakness  Went to [facility] ED  MRI with recurrent disc  Pt seen resting in bed comfortably. Pt had constant low back pain. Pain radiated to left buttock, left hamstring, left ant tib to the dorsal aspect of left foot with numbness and weakness. Medications were helping with the pain. Pt denied bowel and bladder dysfunction.  motor strength: 4/5 of the left EHL, 4/5 of the left ant tib and left gastroc, 4/5 of the left HF and left quad. 5/5 of the right quad, HF, ant tib, gastrocs, and EHL.  Sensation: numbness of the left quad, left ant tib, dorsal aspect of left foot.  tenderness to palpation of the left buttock and left paraspinal region of the lumbar region.  VSS, afeb. Was NPO after midnight. WBC: wnl, H/H: wnl, PT: wnl, INR: wnl. Type and screen done  Appreciate medicine note: Patient is low risk for major cardiac complications in perioperative for intermediate risk surgery    09/02/21:   Left L5-S1 revision discectomy, MAS-TLIF done. No drain. No barrientos. No PCA    09/03/2021: POD#1  VSS, afeb, voiding, has LSO. PT/mob- to work on stairs     09/04/2021: POD#2  Pt seen resting on chair. Pt c/o incisional site soreness and slow to mobilize with physical therapy. Pt states he had some difficulty with physical therapy sessions with stairs mainly. Pain was tolerable with current meds. Pt denies lower extremities pain, numbness, and weakness. Voided on own without complications. Passing flatus.  Gen appearance: NAD  Motor strength: 5/5 of b/l HF, quads, gastroc. 5/5 of the right EHL and ant tib. 4/5 of the left ant tib and EHL.  Sensation: intact to light touch bilat  No calf tenderness bilat. Venodynes on.  Incisional site: Dry dressing intact of the posterior incisional site.  Afeb, HR:102, BP:121/59 monitor at this time.  WBC: wnl, H/H: 12.2/35.7  Continue physical therapy and encouraged incentive spirometer. Continue analgesics.    09/05/2021: POD#3  Pt seen resting on chair. Pt states he has spasms of the back. Pt denies lower extremities pain and numbness. Voided on own without complications.  Mobilized with physical therapy and was able to do stairs. He states Tizanidine 2mg BID does not help with the spasms. Pt denies chest pain, SOB, vertigo.  Gen appearance: NAD  motor strength: 5/5 of b/l quads, HF, gastrocs. 5/5 of the right ant tib and gastroc. 4/5 of the left ant tib and left gastroc  Sensation: intact to light touch bilat  No calf tenderness bilat  Incisional site: no active drainage. Dressing changed. Steristrips intact.  Afeb, VSS  WBC: wnl, H/H: 11.3/33.7  Mobilize with physical therapy and encouraged incentive spirometer.  Changed Tizanidine 2mg BID to Tizanidine 4mg TID. spoke to Pharmacy.    09/06/2021: POD#4  Pt seen resting in bed. Pt ambulated down the halls earlier. He was able to participate with physical therapy. Pt has weakness of the LLE unchanged from pre-op. Pt has incisional site soreness tolerable with current meds. Pt is anxious to go home.    PE  Gen appearance: NAD  motor strength: 4/5 of the left ant tib, left gastroc. 5/5 of b/l quads, HF, gastrocs. 5/5 of the right EHL and ant tib.  Sensation: intact to light touch.  No calf tenderness bilat  Incisional site: posterior incisional sites with steristrips. Dressing changed. Previous dressing clean. No active drainage.     Plan  Afeb, VSS  WBC: wnl, H/H: 11.7/34.6  Stable for DC to home.  Pain meds sent from office EMR.

## 2021-09-06 NOTE — PROGRESS NOTE ADULT - SUBJECTIVE AND OBJECTIVE BOX
POD#4. Pt seen resting in bed.  Pt ambulated down the halls earlier. He was able to participate with physical therapy. Pt has weakness of the LLE unchanged from pre-op. Pt has incisional site soreness tolerable with current meds. Pt is anxious to go home.     PE  Gen appearance: NAD  motor strength: 4/5 of the left ant tib, left gastroc. 5/5 of b/l quads, HF, gastrocs. 5/5 of the right EHL and ant tib.   Sensation: intact to light touch.   No calf tenderness bilat  Incisional site: posterior incisional sites with steristrips. Dressing changed. Previous dressing clean.    Plan  Afeb, VSS  WBC: wnl, H/H: 11.7/34.6  Stable for DC to home.   Pain meds sent from office EMR.   Discussed case with Dr. Ramires.

## 2021-09-06 NOTE — PROGRESS NOTE ADULT - SUBJECTIVE AND OBJECTIVE BOX
C/C: back pain    HPI: 32 y/o M w/ PMH of sleep apnea (resolved since weight loss), GERD, L5-S1 microdiscectomy 7/21 , p/w back pain and LLE radiculopathy.  Patient states that he developed sudden onset back pain and LLE numbness reaching for his phone . States he has back pain when he ambulates, and it improves with rest. He was admitted with reherniation of his lumbar disc. Now s/p L5-S1 TLIF.     9/6: Pt seen and examined on 2N. OOB to chair, Doing well. Having intermittent muscle spasms but more tolerable, amb with walker without assist, no sob/chest pain. tolerating po. no difficulty voiding. no n/v. +BM    ROS: all 10 systems reviewed and is as above otherwise negative.     Vital Signs Last 24 Hrs  T(C): 37 (09-06-21 @ 08:54), Max: 37 (09-06-21 @ 08:54)  T(F): 98.6 (09-06-21 @ 08:54), Max: 98.6 (09-06-21 @ 08:54)  HR: 72 (09-06-21 @ 08:54) (67 - 98)  BP: 109/58 (09-06-21 @ 08:54) (109/58 - 113/60)  BP(mean): --  RR: 16 (09-06-21 @ 08:54) (16 - 16)  SpO2: 100% (09-06-21 @ 08:54) (98% - 100%)    PHYSICAL EXAM:    GENERAL: Comfortable, no acute distress   HEAD:  Normocephalic, atraumatic  EYES: EOMI, PERRLA  HEENT: Moist mucous membranes  NECK: Supple, No JVD  NERVOUS SYSTEM:  Alert & Oriented X3, Motor Strength 5/5 B/L upper and lower extremities  CHEST/LUNG: Clear to auscultation bilaterally  HEART: Regular rate and rhythm  ABDOMEN: Soft, non tender, Nondistended, Bowel sounds present  GENITOURINARY: Voiding, no palpable bladder  EXTREMITIES:   No clubbing, cyanosis, or edema  MUSCULOSKELETAL-back dsg intact, lumbar brace in place  SKIN-no rash    LABS:                                         11.7   7.36  )-----------( 178      ( 06 Sep 2021 06:54 )             34.6       09-05    138  |  107  |  16  ----------------------------<  74  4.0   |  30  |  0.84    Ca    8.5      05 Sep 2021 08:17               Xray Chest 2 Views PA/Lat (08.31.21 @ 16:39) >    EXAM:  XR CHEST PA LAT 2V                        PROCEDURE DATE:  08/31/2021    INTERPRETATION:  Chest 2 views  HISTORY: Preop  COMPARISON: 7/1/2021  Frontal and lateral views of the chest were obtained with suboptimal inspiration. With allowance for this, the heart is normal in size and the lungs are clear. There is no evidence of pneumothorax nor pleural effusion.  IMPRESSION: No active pulmonary disease.          Xray Lumbosacral Spine (08.31.21 @ 16:38) >  EXAM:  XR LS SPINE AP LAT 2-3 VIEWS                        PROCEDURE DATE:  08/31/2021    INTERPRETATION:  Lumbar spine:  HISTORY: Low back pain  COMPARISON: 8/23/2010  Three views of the lumbar spine reveal normal alignment and curvature in the lateral view. Mild levoscoliosis is seen in the frontal view.  The vertebral bodies are satisfactory in height with no evidence of recent or old compression fracture. The pedicles, spinous processes and transverse processes and sacroiliac joints with similar accessory SI joints appear normal. The disc spaces show mild narrowing of L5-S1.  IMPRESSION:  No acute bony pathology of the lumbosacral spine.       MR Lumbar Spine w/wo IV Cont (08.30.21 @ 23:05) >  EXAM:  MR SPINE LUMBAR WAW IC                        PROCEDURE DATE:  08/30/2021    INTERPRETATION:  ITrey MD, have reviewed the images and the report and agree with the findings, with the following modifications:  IV contrast: 7.5 mL IV Gadavist.  Agree that the patient is status post left hemilaminectomy at L5-S1 with associated postsurgical changes. There is enhancement within the dorsal subcutaneous soft tissues and paraspinal muscles extending into the laminectomy bed without evidence of drainable fluid collection. Agree that there is enhancement within the left lateral common dorsal and ventral epidural space at the L5-S1 level which may be secondary to postsurgical changes although phlegmon/infection can have a similar appearance and clinical correlation is recommended. No evidence of drainable fluid collection within the epidural space.  Agree that there is a left lateral recess/foraminal disc herniation at L5-S1 with mass effect on the descending left S1 and exiting left L5 nerve roots.  Additional findings:  There is dextroscoliosis of the thoracolumbar junction. The vertebral body heights are maintained. There is trace retrolisthesis of L5 on S1. There is disc desiccation and disc space narrowing at L5-S1. The conus medullaris terminates at the L1-L2 level and is normal signal and morphology.  At L1-L2 there is a disc bulge without stenotic and spinal canal stenosis or neural foraminal narrowing.  At L4-L5 there is a disc bulge with facet hypertrophy with mild spinal canal stenosis and bilateral neural foraminal narrowing.    MEDICATIONS  (STANDING):  buPROPion XL (24-Hour) . 150 milliGRAM(s) Oral daily  celecoxib 200 milliGRAM(s) Oral every 12 hours  FLUoxetine 20 milliGRAM(s) Oral daily  gabapentin 300 milliGRAM(s) Oral every 8 hours  lactated ringers. 1000 milliLiter(s) (75 mL/Hr) IV Continuous <Continuous>  pantoprazole    Tablet 40 milliGRAM(s) Oral before breakfast  polyethylene glycol 3350 17 Gram(s) Oral daily  senna 2 Tablet(s) Oral at bedtime  tiZANidine 4 milliGRAM(s) Oral three times a day    MEDICATIONS  (PRN):  acetaminophen   Tablet .. 650 milliGRAM(s) Oral every 6 hours PRN Temp greater or equal to 38C (100.4F)  acetaminophen   Tablet .. 975 milliGRAM(s) Oral every 6 hours PRN Temp greater or equal to 38C (100.4F), Mild Pain (1 - 3)  diphenhydrAMINE 25 milliGRAM(s) Oral every 4 hours PRN Insomnia  diphenhydrAMINE   Injectable 12.5 milliGRAM(s) IV Push every 4 hours PRN Itching  HYDROmorphone  Injectable 0.5 milliGRAM(s) SubCutaneous every 4 hours PRN Severe Pain (7 - 10)  magnesium hydroxide Suspension 30 milliLiter(s) Oral every 12 hours PRN Constipation  ondansetron Injectable 4 milliGRAM(s) IV Push every 6 hours PRN Nausea  oxyCODONE    IR 5 milliGRAM(s) Oral every 4 hours PRN Moderate Pain (4 - 6)  oxyCODONE    IR 10 milliGRAM(s) Oral every 4 hours PRN Severe Pain (7 - 10)  prochlorperazine   Injectable 10 milliGRAM(s) IV Push once PRN Nausea not relieved by Zofran  traMADol 50 milliGRAM(s) Oral every 6 hours PRN Mild Pain (1 - 3)              ASSESSMENT AND PLAN:  32 y/o M w/  above PMH a/w:    #Recurrent lumbar disc herniation:  -s/p TLIF L5-S1 pod#4  -pain control wtih prn oxycodone/tramadol/   tizanidine for muscle spasms  -physical therapy  -incentive spirometry  -bowel regimen    #GERD  -PPI    #Anxiety/Depression:  -continue bupropion/fluoxetine.    *DVT ppx  amb  venodynes    dispo: ok for discharge home from [facility], discharge per spine

## 2021-09-06 NOTE — ED ADULT TRIAGE NOTE - CHIEF COMPLAINT QUOTE
pt had spinal surgery at [facility] 5 days ago. c/o worsening back pain started 3hour pta. Pt got himself back in bed and pain started + numbness on right leg. Denies new fall or injury. pt had spinal surgery at [facility] 5 days ago. c/o worsening back pain started 3hour pta. Pt got himself back in bed and pain started + numbness on right leg. Denies new fall or injury. 20mg morphine IM given by EMS.

## 2021-09-06 NOTE — ED ADULT NURSE NOTE - OBJECTIVE STATEMENT
pt presents to the ED s/p spinal surgery at [facility] 5 days ago. c/o worsening back pain started 3hour pta. Pt got himself back in bed and pain started + numbness on right leg. Denies new fall or injury. 20mg morphine IM given by EMS with no improvement.

## 2021-09-06 NOTE — DISCHARGE NOTE NURSING/CASE MANAGEMENT/SOCIAL WORK - PATIENT PORTAL LINK FT
You can access the FollowMyHealth Patient Portal offered by [facility] by registering at the following website: [email or web address]. By joining [facility] [employer] [facility]’s FollowMyHealth portal, you will also be able to view your health information using other applications (apps) compatible with our system.

## 2021-09-06 NOTE — ED PROVIDER NOTE - OBJECTIVE STATEMENT
32 y/o male in ED c/o chronic lower back pain states persistent pain.   pt states d/c from  today for same.   states back pain worse 3 hour PTAQ.   states has been taking his pain meds with no relief.   tolerating PO.   no sick contacts or recent travel.   states still with numbness ad tingling to LLE unchanged

## 2021-09-06 NOTE — ED ADULT NURSE NOTE - NSIMPLEMENTINTERV_GEN_ALL_ED
Implemented All Universal Safety Interventions:  [place] [employer] to call system. Call bell, personal items and telephone within reach. Instruct patient to call for assistance. Room bathroom lighting operational. Non-slip footwear when patient is off stretcher. Physically safe environment: no spills, clutter or unnecessary equipment. Stretcher in lowest position, wheels locked, appropriate side rails in place.

## 2021-09-06 NOTE — DISCHARGE NOTE PROVIDER - NSDCCPGOAL_GEN_ALL_CORE_FT
To get better and follow your care plan as instructed. Keep incisional site clean and dry. Avoid bending, lifting, and twisting.

## 2021-09-07 DIAGNOSIS — M54.9 DORSALGIA, UNSPECIFIED: ICD-10-CM

## 2021-09-07 LAB
ALBUMIN SERPL ELPH-MCNC: 3.1 G/DL — LOW (ref 3.3–5)
ALP SERPL-CCNC: 46 U/L — SIGNIFICANT CHANGE UP (ref 40–120)
ALT FLD-CCNC: 47 U/L — SIGNIFICANT CHANGE UP (ref 12–78)
ANION GAP SERPL CALC-SCNC: 3 MMOL/L — LOW (ref 5–17)
AST SERPL-CCNC: 43 U/L — HIGH (ref 15–37)
BILIRUB SERPL-MCNC: 0.4 MG/DL — SIGNIFICANT CHANGE UP (ref 0.2–1.2)
BUN SERPL-MCNC: 15 MG/DL — SIGNIFICANT CHANGE UP (ref 7–23)
CALCIUM SERPL-MCNC: 8.8 MG/DL — SIGNIFICANT CHANGE UP (ref 8.5–10.1)
CHLORIDE SERPL-SCNC: 106 MMOL/L — SIGNIFICANT CHANGE UP (ref 96–108)
CO2 SERPL-SCNC: 30 MMOL/L — SIGNIFICANT CHANGE UP (ref 22–31)
COVID-19 SPIKE DOMAIN AB INTERP: POSITIVE
COVID-19 SPIKE DOMAIN ANTIBODY RESULT: >250 U/ML — HIGH
CREAT SERPL-MCNC: 0.8 MG/DL — SIGNIFICANT CHANGE UP (ref 0.5–1.3)
CRP SERPL-MCNC: 49 MG/L — HIGH
ERYTHROCYTE [SEDIMENTATION RATE] IN BLOOD: 51 MM/HR — HIGH (ref 0–15)
GLUCOSE SERPL-MCNC: 79 MG/DL — SIGNIFICANT CHANGE UP (ref 70–99)
POTASSIUM SERPL-MCNC: 4.1 MMOL/L — SIGNIFICANT CHANGE UP (ref 3.5–5.3)
POTASSIUM SERPL-SCNC: 4.1 MMOL/L — SIGNIFICANT CHANGE UP (ref 3.5–5.3)
PROT SERPL-MCNC: 6.5 GM/DL — SIGNIFICANT CHANGE UP (ref 6–8.3)
SARS-COV-2 IGG+IGM SERPL QL IA: >250 U/ML — HIGH
SARS-COV-2 IGG+IGM SERPL QL IA: POSITIVE
SARS-COV-2 RNA SPEC QL NAA+PROBE: SIGNIFICANT CHANGE UP
SODIUM SERPL-SCNC: 139 MMOL/L — SIGNIFICANT CHANGE UP (ref 135–145)

## 2021-09-07 PROCEDURE — 85027 COMPLETE CBC AUTOMATED: CPT

## 2021-09-07 PROCEDURE — 72100 X-RAY EXAM L-S SPINE 2/3 VWS: CPT | Mod: 26

## 2021-09-07 PROCEDURE — 36415 COLL VENOUS BLD VENIPUNCTURE: CPT

## 2021-09-07 PROCEDURE — 86140 C-REACTIVE PROTEIN: CPT

## 2021-09-07 PROCEDURE — 97530 THERAPEUTIC ACTIVITIES: CPT | Mod: GP

## 2021-09-07 PROCEDURE — 90686 IIV4 VACC NO PRSV 0.5 ML IM: CPT

## 2021-09-07 PROCEDURE — 85652 RBC SED RATE AUTOMATED: CPT

## 2021-09-07 PROCEDURE — 97116 GAIT TRAINING THERAPY: CPT | Mod: GP

## 2021-09-07 PROCEDURE — 99024 POSTOP FOLLOW-UP VISIT: CPT

## 2021-09-07 PROCEDURE — 97162 PT EVAL MOD COMPLEX 30 MIN: CPT | Mod: GP

## 2021-09-07 PROCEDURE — G0008: CPT

## 2021-09-07 RX ORDER — HYDROMORPHONE HYDROCHLORIDE 2 MG/ML
0.5 INJECTION INTRAMUSCULAR; INTRAVENOUS; SUBCUTANEOUS EVERY 6 HOURS
Refills: 0 | Status: DISCONTINUED | OUTPATIENT
Start: 2021-09-07 | End: 2021-09-07

## 2021-09-07 RX ORDER — CETIRIZINE HYDROCHLORIDE 10 MG/1
10 TABLET ORAL DAILY
Refills: 0 | Status: DISCONTINUED | OUTPATIENT
Start: 2021-09-07 | End: 2021-09-09

## 2021-09-07 RX ORDER — ACETAMINOPHEN 500 MG
975 TABLET ORAL EVERY 8 HOURS
Refills: 0 | Status: DISCONTINUED | OUTPATIENT
Start: 2021-09-07 | End: 2021-09-09

## 2021-09-07 RX ORDER — KETOROLAC TROMETHAMINE 30 MG/ML
30 SYRINGE (ML) INJECTION ONCE
Refills: 0 | Status: DISCONTINUED | OUTPATIENT
Start: 2021-09-07 | End: 2021-09-07

## 2021-09-07 RX ORDER — DEXAMETHASONE 0.5 MG/5ML
6 ELIXIR ORAL ONCE
Refills: 0 | Status: DISCONTINUED | OUTPATIENT
Start: 2021-09-07 | End: 2021-09-07

## 2021-09-07 RX ORDER — HYDROMORPHONE HYDROCHLORIDE 2 MG/ML
1 INJECTION INTRAMUSCULAR; INTRAVENOUS; SUBCUTANEOUS ONCE
Refills: 0 | Status: DISCONTINUED | OUTPATIENT
Start: 2021-09-07 | End: 2021-09-07

## 2021-09-07 RX ORDER — MAGNESIUM HYDROXIDE 400 MG/1
30 TABLET, CHEWABLE ORAL DAILY
Refills: 0 | Status: DISCONTINUED | OUTPATIENT
Start: 2021-09-07 | End: 2021-09-09

## 2021-09-07 RX ORDER — FLUOXETINE HCL 10 MG
20 CAPSULE ORAL DAILY
Refills: 0 | Status: DISCONTINUED | OUTPATIENT
Start: 2021-09-07 | End: 2021-09-09

## 2021-09-07 RX ORDER — BUPROPION HYDROCHLORIDE 150 MG/1
1 TABLET, EXTENDED RELEASE ORAL
Qty: 0 | Refills: 0 | DISCHARGE

## 2021-09-07 RX ORDER — INFLUENZA VIRUS VACCINE 15; 15; 15; 15 UG/.5ML; UG/.5ML; UG/.5ML; UG/.5ML
0.5 SUSPENSION INTRAMUSCULAR ONCE
Refills: 0 | Status: COMPLETED | OUTPATIENT
Start: 2021-09-07 | End: 2021-09-09

## 2021-09-07 RX ORDER — HYDROMORPHONE HYDROCHLORIDE 2 MG/ML
0.5 INJECTION INTRAMUSCULAR; INTRAVENOUS; SUBCUTANEOUS EVERY 4 HOURS
Refills: 0 | Status: DISCONTINUED | OUTPATIENT
Start: 2021-09-07 | End: 2021-09-09

## 2021-09-07 RX ORDER — TIZANIDINE 4 MG/1
4 TABLET ORAL THREE TIMES A DAY
Refills: 0 | Status: DISCONTINUED | OUTPATIENT
Start: 2021-09-07 | End: 2021-09-07

## 2021-09-07 RX ORDER — OXYCODONE HYDROCHLORIDE 5 MG/1
10 TABLET ORAL EVERY 4 HOURS
Refills: 0 | Status: DISCONTINUED | OUTPATIENT
Start: 2021-09-07 | End: 2021-09-09

## 2021-09-07 RX ORDER — FLUOXETINE HCL 10 MG
1 CAPSULE ORAL
Qty: 0 | Refills: 0 | DISCHARGE

## 2021-09-07 RX ORDER — GABAPENTIN 400 MG/1
300 CAPSULE ORAL ONCE
Refills: 0 | Status: COMPLETED | OUTPATIENT
Start: 2021-09-07 | End: 2021-09-07

## 2021-09-07 RX ORDER — DEXAMETHASONE 0.5 MG/5ML
4 ELIXIR ORAL ONCE
Refills: 0 | Status: COMPLETED | OUTPATIENT
Start: 2021-09-08 | End: 2021-09-08

## 2021-09-07 RX ORDER — DIPHENHYDRAMINE HCL 50 MG
50 CAPSULE ORAL ONCE
Refills: 0 | Status: COMPLETED | OUTPATIENT
Start: 2021-09-07 | End: 2021-09-07

## 2021-09-07 RX ORDER — LISDEXAMFETAMINE DIMESYLATE 70 MG/1
1 CAPSULE ORAL
Qty: 0 | Refills: 0 | DISCHARGE

## 2021-09-07 RX ORDER — TIZANIDINE 4 MG/1
6 TABLET ORAL THREE TIMES A DAY
Refills: 0 | Status: DISCONTINUED | OUTPATIENT
Start: 2021-09-07 | End: 2021-09-09

## 2021-09-07 RX ORDER — OXYCODONE HYDROCHLORIDE 5 MG/1
5 TABLET ORAL EVERY 4 HOURS
Refills: 0 | Status: DISCONTINUED | OUTPATIENT
Start: 2021-09-07 | End: 2021-09-09

## 2021-09-07 RX ORDER — POLYETHYLENE GLYCOL 3350 17 G/17G
17 POWDER, FOR SOLUTION ORAL DAILY
Refills: 0 | Status: DISCONTINUED | OUTPATIENT
Start: 2021-09-07 | End: 2021-09-09

## 2021-09-07 RX ORDER — CETIRIZINE HYDROCHLORIDE 10 MG/1
1 TABLET ORAL
Qty: 0 | Refills: 0 | DISCHARGE

## 2021-09-07 RX ORDER — BUPROPION HYDROCHLORIDE 150 MG/1
150 TABLET, EXTENDED RELEASE ORAL DAILY
Refills: 0 | Status: DISCONTINUED | OUTPATIENT
Start: 2021-09-07 | End: 2021-09-09

## 2021-09-07 RX ORDER — MORPHINE SULFATE 50 MG/1
4 CAPSULE, EXTENDED RELEASE ORAL ONCE
Refills: 0 | Status: DISCONTINUED | OUTPATIENT
Start: 2021-09-07 | End: 2021-09-07

## 2021-09-07 RX ORDER — ACETAMINOPHEN 500 MG
1000 TABLET ORAL ONCE
Refills: 0 | Status: COMPLETED | OUTPATIENT
Start: 2021-09-07 | End: 2021-09-07

## 2021-09-07 RX ORDER — DEXAMETHASONE 0.5 MG/5ML
6 ELIXIR ORAL ONCE
Refills: 0 | Status: COMPLETED | OUTPATIENT
Start: 2021-09-07 | End: 2021-09-07

## 2021-09-07 RX ORDER — GABAPENTIN 400 MG/1
300 CAPSULE ORAL EVERY 8 HOURS
Refills: 0 | Status: DISCONTINUED | OUTPATIENT
Start: 2021-09-07 | End: 2021-09-09

## 2021-09-07 RX ADMIN — HYDROMORPHONE HYDROCHLORIDE 1 MILLIGRAM(S): 2 INJECTION INTRAMUSCULAR; INTRAVENOUS; SUBCUTANEOUS at 01:40

## 2021-09-07 RX ADMIN — HYDROMORPHONE HYDROCHLORIDE 0.5 MILLIGRAM(S): 2 INJECTION INTRAMUSCULAR; INTRAVENOUS; SUBCUTANEOUS at 19:44

## 2021-09-07 RX ADMIN — OXYCODONE HYDROCHLORIDE 10 MILLIGRAM(S): 5 TABLET ORAL at 22:22

## 2021-09-07 RX ADMIN — Medication 30 MILLIGRAM(S): at 00:55

## 2021-09-07 RX ADMIN — TIZANIDINE 4 MILLIGRAM(S): 4 TABLET ORAL at 05:43

## 2021-09-07 RX ADMIN — OXYCODONE HYDROCHLORIDE 10 MILLIGRAM(S): 5 TABLET ORAL at 17:54

## 2021-09-07 RX ADMIN — HYDROMORPHONE HYDROCHLORIDE 0.5 MILLIGRAM(S): 2 INJECTION INTRAMUSCULAR; INTRAVENOUS; SUBCUTANEOUS at 10:54

## 2021-09-07 RX ADMIN — HYDROMORPHONE HYDROCHLORIDE 0.5 MILLIGRAM(S): 2 INJECTION INTRAMUSCULAR; INTRAVENOUS; SUBCUTANEOUS at 19:15

## 2021-09-07 RX ADMIN — OXYCODONE HYDROCHLORIDE 10 MILLIGRAM(S): 5 TABLET ORAL at 05:42

## 2021-09-07 RX ADMIN — TIZANIDINE 4 MILLIGRAM(S): 4 TABLET ORAL at 14:25

## 2021-09-07 RX ADMIN — OXYCODONE HYDROCHLORIDE 10 MILLIGRAM(S): 5 TABLET ORAL at 09:47

## 2021-09-07 RX ADMIN — GABAPENTIN 300 MILLIGRAM(S): 400 CAPSULE ORAL at 00:35

## 2021-09-07 RX ADMIN — CETIRIZINE HYDROCHLORIDE 10 MILLIGRAM(S): 10 TABLET ORAL at 10:00

## 2021-09-07 RX ADMIN — OXYCODONE HYDROCHLORIDE 10 MILLIGRAM(S): 5 TABLET ORAL at 14:03

## 2021-09-07 RX ADMIN — OXYCODONE HYDROCHLORIDE 10 MILLIGRAM(S): 5 TABLET ORAL at 15:03

## 2021-09-07 RX ADMIN — BUPROPION HYDROCHLORIDE 150 MILLIGRAM(S): 150 TABLET, EXTENDED RELEASE ORAL at 10:00

## 2021-09-07 RX ADMIN — Medication 20 MILLIGRAM(S): at 09:47

## 2021-09-07 RX ADMIN — Medication 6 MILLIGRAM(S): at 20:02

## 2021-09-07 RX ADMIN — Medication 50 MILLIGRAM(S): at 23:38

## 2021-09-07 RX ADMIN — HYDROMORPHONE HYDROCHLORIDE 0.5 MILLIGRAM(S): 2 INJECTION INTRAMUSCULAR; INTRAVENOUS; SUBCUTANEOUS at 11:24

## 2021-09-07 RX ADMIN — TIZANIDINE 6 MILLIGRAM(S): 4 TABLET ORAL at 21:05

## 2021-09-07 RX ADMIN — OXYCODONE HYDROCHLORIDE 10 MILLIGRAM(S): 5 TABLET ORAL at 23:21

## 2021-09-07 RX ADMIN — Medication 30 MILLIGRAM(S): at 01:42

## 2021-09-07 RX ADMIN — OXYCODONE HYDROCHLORIDE 10 MILLIGRAM(S): 5 TABLET ORAL at 18:54

## 2021-09-07 RX ADMIN — HYDROMORPHONE HYDROCHLORIDE 1 MILLIGRAM(S): 2 INJECTION INTRAMUSCULAR; INTRAVENOUS; SUBCUTANEOUS at 00:57

## 2021-09-07 RX ADMIN — MORPHINE SULFATE 4 MILLIGRAM(S): 50 CAPSULE, EXTENDED RELEASE ORAL at 01:42

## 2021-09-07 RX ADMIN — Medication 400 MILLIGRAM(S): at 16:58

## 2021-09-07 RX ADMIN — Medication 1000 MILLIGRAM(S): at 17:13

## 2021-09-07 RX ADMIN — GABAPENTIN 300 MILLIGRAM(S): 400 CAPSULE ORAL at 09:47

## 2021-09-07 RX ADMIN — GABAPENTIN 300 MILLIGRAM(S): 400 CAPSULE ORAL at 17:55

## 2021-09-07 RX ADMIN — OXYCODONE HYDROCHLORIDE 10 MILLIGRAM(S): 5 TABLET ORAL at 05:12

## 2021-09-07 RX ADMIN — OXYCODONE HYDROCHLORIDE 10 MILLIGRAM(S): 5 TABLET ORAL at 10:16

## 2021-09-07 RX ADMIN — MORPHINE SULFATE 4 MILLIGRAM(S): 50 CAPSULE, EXTENDED RELEASE ORAL at 03:45

## 2021-09-07 RX ADMIN — HYDROMORPHONE HYDROCHLORIDE 0.5 MILLIGRAM(S): 2 INJECTION INTRAMUSCULAR; INTRAVENOUS; SUBCUTANEOUS at 23:38

## 2021-09-07 NOTE — PROGRESS NOTE ADULT - SUBJECTIVE AND OBJECTIVE BOX
Pt seen and examined at bedside this AM. Pt resting more comfortably. Reports bilateral radicular pain. Pt reports decrease in back pain with Dilaudid and oxycodone. Denies loss of bowel/bladder function. Denies fever/chills/night sweats. Pt able to ambulate and used urinal during exam.     VITAL SIGNS:  T(C): 36.6 (09-07-21 @ 11:23), Max: 37.8 (09-06-21 @ 21:25)  HR: 68 (09-07-21 @ 11:23) (64 - 100)  BP: 126/81 (09-07-21 @ 11:23) (119/66 - 136/71)  RR: 18 (09-07-21 @ 11:23) (17 - 18)  SpO2: 100% (09-07-21 @ 11:23) (100% - 100%)    LABS:                        11.6   8.01  )-----------( 215      ( 06 Sep 2021 23:27 )             33.5     09-06    139  |  106  |  15  ----------------------------<  79  4.1   |  30  |  0.80    Ca    8.8      06 Sep 2021 23:27    TPro  6.5  /  Alb  3.1<L>  /  TBili  0.4  /  DBili  x   /  AST  43<H>  /  ALT  47  /  AlkPhos  46  09-06      PHYSICAL EXAM  GEN: NAD, AAOx3    SPINE:  Skin intact  + Paraspinal TTP lumbar spine  Grossly moving all extremities  + DP Pulse    MOTOR EXAM:                          Elbow Flex (C5)     Wrist Ext (C6)     Elbow Ext (C7)      Finger Flex (C8)    Finger Abduction (T1)  RIGHT                 5/5                         5/5                         5/5                          5/5                              5/5  LEFT                    5/5                         5/5                         5/5                          5/5                              5/5                        Hip Flex (L2)      Knee Ext (L3)      Ank Dorsiflex (L4)     Hallux Ext (L5)     Ank PlantarFlex (S1)  RIGHT               4/5                      4/5                          4/5                            4/5                           4/5  LEFT                  4/5                      4/5                          4/5                            4/5                           4/5    *Motor exam limited 2/2 pain / discomfort   SENSORY EXAM:                       L2        L3       L4      L5       S1          RIGHT        1          1         1        1        1           (0=absent, 1=impaired, 2=normal, NT=not testable)  LEFT           1          1         1        1        1           (0=absent, 1=impaired, 2=normal, NT=not testable)    Positive Martino's sign bilaterally  Negative Babinski bilaterally   Negative Myoclonus bilaterally       Pt seen and examined at bedside this AM. Pt resting more comfortably. Reports bilateral radicular pain. Pt reports decrease in back pain with Dilaudid and oxycodone. Denies loss of bowel/bladder function. Denies fever/chills/night sweats. Pt able to ambulate and used urinal during exam.     VITAL SIGNS:  T(C): 36.6 (09-07-21 @ 11:23), Max: 37.8 (09-06-21 @ 21:25)  HR: 68 (09-07-21 @ 11:23) (64 - 100)  BP: 126/81 (09-07-21 @ 11:23) (119/66 - 136/71)  RR: 18 (09-07-21 @ 11:23) (17 - 18)  SpO2: 100% (09-07-21 @ 11:23) (100% - 100%)    LABS:                        11.6   8.01  )-----------( 215      ( 06 Sep 2021 23:27 )             33.5     09-06    139  |  106  |  15  ----------------------------<  79  4.1   |  30  |  0.80    Ca    8.8      06 Sep 2021 23:27    TPro  6.5  /  Alb  3.1<L>  /  TBili  0.4  /  DBili  x   /  AST  43<H>  /  ALT  47  /  AlkPhos  46  09-06      PHYSICAL EXAM  GEN: NAD, AAOx3    SPINE:  Dressing CDI, redressed  Well healing, steri-strips in place, no active drainage, no signs of purulence  + Paraspinal TTP lumbar spine  Grossly moving all extremities  + DP Pulse    MOTOR EXAM:                          Elbow Flex (C5)     Wrist Ext (C6)     Elbow Ext (C7)      Finger Flex (C8)    Finger Abduction (T1)  RIGHT                 5/5                         5/5                         5/5                          5/5                              5/5  LEFT                    5/5                         5/5                         5/5                          5/5                              5/5                        Hip Flex (L2)      Knee Ext (L3)      Ank Dorsiflex (L4)     Hallux Ext (L5)     Ank PlantarFlex (S1)  RIGHT               4/5                      4/5                          4/5                            4/5                           4/5  LEFT                  4/5                      4/5                          4/5                            4/5                           4/5    *Motor exam limited 2/2 pain / discomfort   SENSORY EXAM:                       L2        L3       L4      L5       S1          RIGHT        1          1         1        1        1           (0=absent, 1=impaired, 2=normal, NT=not testable)  LEFT           1          1         1        1        1           (0=absent, 1=impaired, 2=normal, NT=not testable)    Positive Martino's sign bilaterally  Negative Babinski bilaterally   Negative Myoclonus bilaterally

## 2021-09-07 NOTE — H&P ADULT - HISTORY OF PRESENT ILLNESS
31y Male presents with intractable back pain. Pt is s/p L5-S1 TLIF on 9/2/21. Pt was discharged from the hospital on 9/6. Reports he was doing well while in the hospital, able to walk with PT, felt well to go home. Admits feeling well while home, able to have dinner with his family. Reports he went to bed feeling well. Woke up to go to the bathroom and had significant difficulty getting himself out of bed. Reports significant pain while trying to get out of bed. States "it took me an hour and a half to get out of bed to the bathroom". Admits ability to control bladder. Reports significant pain in his back with radiation into the bilateral LEs with feelings of numbness/tingling diffusely throughout both legs. Denies saddle anesthesia. Denies changes in bowel/bladder function. Denies fevers/chills. Patient walks without assistance at baseline. Pt able to stand with walker in ED to use urinal.

## 2021-09-07 NOTE — H&P ADULT - ASSESSMENT
Assessment/Plan:   31y Male with intractable back pain, s/p L5-S1 TLIF DOS 9/2/21    -Pain control as needed  -WBAT/OOB with assistive devices as needed  -LSO brace for comfort and while ambulating  -Admit to orthopedics Dr. Tariq for pain control  -DVT ppx: SCDs, no chemical DVT ppx in light of recent spine surgery  -Will discuss with attending and will advise if plan changes

## 2021-09-07 NOTE — H&P ADULT - NSHPPHYSICALEXAM_GEN_ALL_CORE
SPINE:  Dressing c/d/i   TTP surrounding surgical site at lumbar spine  NTTP over the bony prominences of the cervical/thoracic/sacral spine  + Mild Paraspinal TTP of the lumbar spine  No bony step-offs   Grossly moving all extremities  + Radial Pulses  + DP Pulses    MOTOR EXAM:                          Elbow Flex (C5)     Wrist Ext (C6)     Elbow Ext (C7)      Finger Flex (C8)    Finger Abduction (T1)  RIGHT                 5/5                         5/5                         5/5                          5/5                              5/5  LEFT                    5/5                         5/5                         5/5                          5/5                              5/5                           Hip Flex (L2)      Knee Ext (L3)      Ank Dorsiflex (L4)     Hallux Ext (L5)     Ank PlantarFlex (S1)  RIGHT               x/5                      3*/5                          4+/5                     5-/5                      5/5  LEFT                  x/5                      3*/5                          4/5                       3/5                        5/5    Unable to test hip flexion strength 2/2 significant pt discomfort  Limited ability to test knee extension strength 2/2 significant pt discomfort, however pt able to hold leg in extended position    SENSORY EXAM:                        C5      C6      C7      C8       T1          RIGHT          2         2        2         2         2          (0=absent, 1=impaired, 2=normal, NT=not testable)  LEFT             2         2        2         2         2          (0=absent, 1=impaired, 2=normal, NT=not testable)                        L2        L3       L4      L5       S1          RIGHT        1           1         1        1        1           (0=absent, 1=impaired, 2=normal, NT=not testable)  LEFT           1           1         1        1        1           (0=absent, 1=impaired, 2=normal, NT=not testable)    Pt reports diffusely decreased sensation in both LEs    Positive Martino's sign bilaterally L>R  Negative Babinski bilaterally   Negative Myoclonus bilaterally

## 2021-09-07 NOTE — PROGRESS NOTE ADULT - ASSESSMENT
32 yo M s/p L5-S1 Discectomy 7/8, s/p TLIF 9/2 w/ intractable back pain    -Pain control PRN  -Continue Home meds  -WBC: 8.01; ESR: 51  -FU CRP  -Consider dose IV decadron if no concern for infection, pending CRP  -Medrol Dose Jose to be discharged with  -Continue outpatient pain regimen  -Due to consistency of exam with preoperative exam, no advanced imagining indicated at this time, will continue to monitor  -No red flag symptoms for epidural hematoma/cord compression at this time, will continue to monitor  -Discussed with Dr. Tariq who is aware and agrees  -WIll advise if plan changes       30 yo M s/p L5-S1 Discectomy 7/8, s/p TLIF 9/2 w/ intractable back pain POD 5:    -Pain control PRN  -Continue Home meds  -WBC: 8.01; ESR: 51  -no signs of infection at this time, ESR elevated at 51 to be expected considering postoperative day 5  -FU CRP  -Consider dose IV decadron if no concern for infection, pending CRP  -Medrol Dose Jose to be discharged with  -Continue outpatient pain regimen  -Due to consistency of exam with preoperative exam, no advanced imagining indicated at this time, will continue to monitor  -No red flag symptoms for epidural hematoma/cord compression at this time, will continue to monitor  -Discussed with Dr. Tariq who is aware and agrees  -WIll advise if plan changes       32 yo M s/p L5-S1 Discectomy 7/8, s/p TLIF 9/2 w/ intractable back pain POD 5:    -admitted for pain control, requiring IV pain meds, PT  -Pain control  -Continue Home meds  -WBC: 8.01; ESR: 51  -no signs of infection at this time, ESR elevated at 51 to be expected considering postoperative day 5  -Dressing changed, wound healing well, with no signs of infection  -FU CRP  -Consider dose IV decadron if no concern for infection, pending CRP  -Medrol Dose Jose to be discharged with  -Continue outpatient pain regimen  -Due to consistency of exam with preoperative exam, no advanced imagining indicated at this time, will continue to monitor  -No red flag symptoms for epidural hematoma/cord compression at this time, will continue to monitor  -Discussed with Dr. Tariq who is aware and agrees  -WIll advise if plan changes

## 2021-09-08 ENCOUNTER — TRANSCRIPTION ENCOUNTER (OUTPATIENT)
Age: 31
End: 2021-09-08

## 2021-09-08 LAB
CRP SERPL-MCNC: 43 MG/L — HIGH
ERYTHROCYTE [SEDIMENTATION RATE] IN BLOOD: 48 MM/HR — HIGH (ref 0–15)
HCT VFR BLD CALC: 35.5 % — LOW (ref 39–50)
HGB BLD-MCNC: 12.1 G/DL — LOW (ref 13–17)
MCHC RBC-ENTMCNC: 30 PG — SIGNIFICANT CHANGE UP (ref 27–34)
MCHC RBC-ENTMCNC: 34.1 GM/DL — SIGNIFICANT CHANGE UP (ref 32–36)
MCV RBC AUTO: 88.1 FL — SIGNIFICANT CHANGE UP (ref 80–100)
PLATELET # BLD AUTO: 243 K/UL — SIGNIFICANT CHANGE UP (ref 150–400)
RBC # BLD: 4.03 M/UL — LOW (ref 4.2–5.8)
RBC # FLD: 12.3 % — SIGNIFICANT CHANGE UP (ref 10.3–14.5)
WBC # BLD: 7.36 K/UL — SIGNIFICANT CHANGE UP (ref 3.8–10.5)
WBC # FLD AUTO: 7.36 K/UL — SIGNIFICANT CHANGE UP (ref 3.8–10.5)

## 2021-09-08 RX ORDER — DIPHENHYDRAMINE HCL 50 MG
50 CAPSULE ORAL ONCE
Refills: 0 | Status: COMPLETED | OUTPATIENT
Start: 2021-09-08 | End: 2021-09-08

## 2021-09-08 RX ORDER — DEXAMETHASONE 0.5 MG/5ML
2 ELIXIR ORAL ONCE
Refills: 0 | Status: COMPLETED | OUTPATIENT
Start: 2021-09-08 | End: 2021-09-08

## 2021-09-08 RX ADMIN — GABAPENTIN 300 MILLIGRAM(S): 400 CAPSULE ORAL at 02:41

## 2021-09-08 RX ADMIN — CETIRIZINE HYDROCHLORIDE 10 MILLIGRAM(S): 10 TABLET ORAL at 11:33

## 2021-09-08 RX ADMIN — OXYCODONE HYDROCHLORIDE 10 MILLIGRAM(S): 5 TABLET ORAL at 03:41

## 2021-09-08 RX ADMIN — HYDROMORPHONE HYDROCHLORIDE 0.5 MILLIGRAM(S): 2 INJECTION INTRAMUSCULAR; INTRAVENOUS; SUBCUTANEOUS at 22:11

## 2021-09-08 RX ADMIN — HYDROMORPHONE HYDROCHLORIDE 0.5 MILLIGRAM(S): 2 INJECTION INTRAMUSCULAR; INTRAVENOUS; SUBCUTANEOUS at 17:15

## 2021-09-08 RX ADMIN — HYDROMORPHONE HYDROCHLORIDE 0.5 MILLIGRAM(S): 2 INJECTION INTRAMUSCULAR; INTRAVENOUS; SUBCUTANEOUS at 00:07

## 2021-09-08 RX ADMIN — OXYCODONE HYDROCHLORIDE 10 MILLIGRAM(S): 5 TABLET ORAL at 20:05

## 2021-09-08 RX ADMIN — TIZANIDINE 6 MILLIGRAM(S): 4 TABLET ORAL at 05:34

## 2021-09-08 RX ADMIN — OXYCODONE HYDROCHLORIDE 10 MILLIGRAM(S): 5 TABLET ORAL at 10:00

## 2021-09-08 RX ADMIN — TIZANIDINE 6 MILLIGRAM(S): 4 TABLET ORAL at 22:10

## 2021-09-08 RX ADMIN — OXYCODONE HYDROCHLORIDE 10 MILLIGRAM(S): 5 TABLET ORAL at 20:35

## 2021-09-08 RX ADMIN — Medication 2 MILLIGRAM(S): at 20:05

## 2021-09-08 RX ADMIN — OXYCODONE HYDROCHLORIDE 10 MILLIGRAM(S): 5 TABLET ORAL at 14:28

## 2021-09-08 RX ADMIN — TIZANIDINE 6 MILLIGRAM(S): 4 TABLET ORAL at 13:57

## 2021-09-08 RX ADMIN — GABAPENTIN 300 MILLIGRAM(S): 400 CAPSULE ORAL at 11:32

## 2021-09-08 RX ADMIN — BUPROPION HYDROCHLORIDE 150 MILLIGRAM(S): 150 TABLET, EXTENDED RELEASE ORAL at 11:33

## 2021-09-08 RX ADMIN — GABAPENTIN 300 MILLIGRAM(S): 400 CAPSULE ORAL at 17:14

## 2021-09-08 RX ADMIN — OXYCODONE HYDROCHLORIDE 10 MILLIGRAM(S): 5 TABLET ORAL at 13:58

## 2021-09-08 RX ADMIN — Medication 4 MILLIGRAM(S): at 09:04

## 2021-09-08 RX ADMIN — OXYCODONE HYDROCHLORIDE 10 MILLIGRAM(S): 5 TABLET ORAL at 02:45

## 2021-09-08 RX ADMIN — Medication 50 MILLIGRAM(S): at 22:26

## 2021-09-08 RX ADMIN — OXYCODONE HYDROCHLORIDE 10 MILLIGRAM(S): 5 TABLET ORAL at 09:04

## 2021-09-08 RX ADMIN — HYDROMORPHONE HYDROCHLORIDE 0.5 MILLIGRAM(S): 2 INJECTION INTRAMUSCULAR; INTRAVENOUS; SUBCUTANEOUS at 22:41

## 2021-09-08 RX ADMIN — Medication 20 MILLIGRAM(S): at 11:33

## 2021-09-08 NOTE — PHYSICAL THERAPY INITIAL EVALUATION ADULT - GENERAL OBSERVATIONS, REHAB EVAL
The pt was received on 2N, sitting OOB and in a chair, pleasant and cooperative, eager to ambulate and to practice getting in and out of bed which posed a great deal of difficulty for the patient at home.

## 2021-09-08 NOTE — PHYSICAL THERAPY INITIAL EVALUATION ADULT - PERTINENT HX OF CURRENT PROBLEM, REHAB EVAL
31y Male presents with intractable back pain. Pt is s/p L5-S1 TLIF on 9/2/21. Pt was discharged from the hospital on 9/6. Reports he was doing well while in the hospital, able to walk with PT, felt well to go home. Admits feeling well while home, able to have dinner with his family. Reports he went to bed feeling well. Woke up to go to the bathroom and had significant difficulty getting himself out of bed.

## 2021-09-08 NOTE — PHYSICAL THERAPY INITIAL EVALUATION ADULT - DISCHARGE DISPOSITION, PT EVAL
Home PT for bed mobility/ transfer tx and for strategies negotiating him home and improving his home ergonomics./home w/ home PT/outpatient PT

## 2021-09-08 NOTE — PROGRESS NOTE ADULT - SUBJECTIVE AND OBJECTIVE BOX
31 year old male  seen/examined this am  felling "much better today."  ambulating in room independently, no assistive devices  motor 5/5 right lower extremity  5/5 left HF, quad, gastroc  mild weakness 5-/5 left TA/EHL  sensation intact to light touch  no calf tenderness or lower extremity edema  continue decadron taper today  mobilize  anticipate d/c home in am on MDP  all questions answered  patient agrees with current plan

## 2021-09-08 NOTE — DISCHARGE NOTE NURSING/CASE MANAGEMENT/SOCIAL WORK - NSDCVIVACCINE_GEN_ALL_CORE_FT
No Vaccines Administered. influenza, injectable, quadrivalent, preservative free; 09-Sep-2021 10:41; Tayler Ordoñez (RN); [employer]; [id number] (Exp. Date: 30-Jun-2022); IntraMuscular; Deltoid Left.; 0.5 milliLiter(s); VIS (VIS Published: 15-Aug-2019, VIS Presented: 09-Sep-2021);

## 2021-09-08 NOTE — PROGRESS NOTE ADULT - ASSESSMENT
30 yo M s/p L5-S1 Discectomy 7/8, s/p TLIF 9/2 w/ intractable back pain POD 6:    -admitted for pain control, requiring IV pain meds, PT  -pt with continued back pain and global decreased sensation of BL/LE, pain improved with medications, motor exam improving now 5/5 TA/PF/EHL, quads and hip flexion limited due to pain  -plan for rpt CRP this AM, decadron 4mg IV to be given this AM  -Pain control  -Continue Home meds  -WBC: 8.01; ESR: 51  -no signs of infection at this time, ESR elevated at 51 to be expected considering postoperative day 6  -Dressing changed, wound healing well, with no signs of infection  -FU CRP  -continue decadron, s/p 6mg dose  -Medrol Dose Jose to be discharged with  -Continue outpatient pain regimen  -Due to consistency of exam with preoperative exam, no advanced imagining indicated at this time, will continue to monitor  -No red flag symptoms for epidural hematoma/cord compression at this time, will continue to monitor  -WIll advise if plan changes

## 2021-09-08 NOTE — PROGRESS NOTE ADULT - SUBJECTIVE AND OBJECTIVE BOX
Pt seen and examined at bedside this AM. Pt resting more comfortably. Reports bilateral radicular pain. Pt reports decrease in back pain with Dilaudid and oxycodone. Denies loss of bowel/bladder function. Denies fever/chills/night sweats.     Vital Signs Last 24 Hrs  T(C): 36.5 (08 Sep 2021 00:09), Max: 36.8 (07 Sep 2021 19:52)  T(F): 97.7 (08 Sep 2021 00:09), Max: 98.2 (07 Sep 2021 19:52)  HR: 72 (08 Sep 2021 00:09) (68 - 96)  BP: 113/54 (08 Sep 2021 00:09) (113/54 - 142/89)  BP(mean): 94 (07 Sep 2021 11:23) (87 - 94)  RR: 17 (08 Sep 2021 00:09) (17 - 18)  SpO2: 99% (08 Sep 2021 00:09) (99% - 100%)    PHYSICAL EXAM  GEN: NAD, AAOx3    SPINE:  Dressing CDI, redressed  Well healing, steri-strips in place, no active drainage, no signs of purulence  + Paraspinal TTP lumbar spine  Grossly moving all extremities  + DP Pulse    MOTOR EXAM:                          Elbow Flex (C5)     Wrist Ext (C6)     Elbow Ext (C7)      Finger Flex (C8)    Finger Abduction (T1)  RIGHT                 5/5                         5/5                         5/5                          5/5                              5/5  LEFT                    5/5                         5/5                         5/5                          5/5                              5/5                        Hip Flex (L2)      Knee Ext (L3)      Ank Dorsiflex (L4)     Hallux Ext (L5)     Ank PlantarFlex (S1)  RIGHT               4/5                      4/5                          5/5                            5/5                           5/5  LEFT                  4/5                      4/5                          5/5                            5/5                           5/5    *Motor exam limited 2/2 pain / discomfort   SENSORY EXAM:                       L2        L3       L4      L5       S1          RIGHT        1          1         1        1        1           (0=absent, 1=impaired, 2=normal, NT=not testable)  LEFT           1          1         1        1        1           (0=absent, 1=impaired, 2=normal, NT=not testable)      Negative Babinski bilaterally   Negative Myoclonus bilaterally

## 2021-09-08 NOTE — PHYSICAL THERAPY INITIAL EVALUATION ADULT - MODALITIES TREATMENT COMMENTS
The pt demonstrated good overall activity tolerance, responding well to therex review, transfer and ambulation tx. The pt was left sitting OOB and in a chair with chair alarm secured, RN aware. CB, tray and phone in place. The pt was in NAD at end of tx. The pt had the most difficulty with bed mobility due to reactivity and with sit to stand from a low bed which mimics his bed setup at  home, multiple transfer strategies reviewed with the patient.

## 2021-09-08 NOTE — DISCHARGE NOTE NURSING/CASE MANAGEMENT/SOCIAL WORK - PATIENT PORTAL LINK FT
You can access the FollowMyHealth Patient Portal offered by [facility] by registering at the following website: [email or web address]. By joining [employer]’s FollowMyHealth portal, you will also be able to view your health information using other applications (apps) compatible with our system.

## 2021-09-08 NOTE — PHYSICAL THERAPY INITIAL EVALUATION ADULT - ADDITIONAL COMMENTS
The pt reports that all of his furniture at home is very low for him, and is concerned about getting in and out of his sofa/ couch and low mattress.

## 2021-09-08 NOTE — PHYSICAL THERAPY INITIAL EVALUATION ADULT - LIVES WITH, PROFILE
The pt reports that he lives in his own \[facility]", 2nd floor apartment and has 2 renters below.

## 2021-09-09 ENCOUNTER — TRANSCRIPTION ENCOUNTER (OUTPATIENT)
Age: 31
End: 2021-09-09

## 2021-09-09 VITALS
SYSTOLIC BLOOD PRESSURE: 129 MMHG | TEMPERATURE: 98 F | RESPIRATION RATE: 18 BRPM | OXYGEN SATURATION: 98 % | DIASTOLIC BLOOD PRESSURE: 48 MMHG | HEART RATE: 62 BPM

## 2021-09-09 DIAGNOSIS — F32.9 MAJOR DEPRESSIVE DISORDER, SINGLE EPISODE, UNSPECIFIED: ICD-10-CM

## 2021-09-09 DIAGNOSIS — M51.17 INTERVERTEBRAL DISC DISORDERS WITH RADICULOPATHY, LUMBOSACRAL REGION: ICD-10-CM

## 2021-09-09 DIAGNOSIS — M62.838 OTHER MUSCLE SPASM: ICD-10-CM

## 2021-09-09 DIAGNOSIS — Z88.7 ALLERGY STATUS TO SERUM AND VACCINE: ICD-10-CM

## 2021-09-09 DIAGNOSIS — F41.9 ANXIETY DISORDER, UNSPECIFIED: ICD-10-CM

## 2021-09-09 DIAGNOSIS — E66.01 MORBID (SEVERE) OBESITY DUE TO EXCESS CALORIES: ICD-10-CM

## 2021-09-09 DIAGNOSIS — G47.30 SLEEP APNEA, UNSPECIFIED: ICD-10-CM

## 2021-09-09 DIAGNOSIS — F90.9 ATTENTION-DEFICIT HYPERACTIVITY DISORDER, UNSPECIFIED TYPE: ICD-10-CM

## 2021-09-09 DIAGNOSIS — Z86.16 PERSONAL HISTORY OF COVID-19: ICD-10-CM

## 2021-09-09 DIAGNOSIS — K58.9 IRRITABLE BOWEL SYNDROME WITHOUT DIARRHEA: ICD-10-CM

## 2021-09-09 DIAGNOSIS — Z98.84 BARIATRIC SURGERY STATUS: ICD-10-CM

## 2021-09-09 DIAGNOSIS — J30.2 OTHER SEASONAL ALLERGIC RHINITIS: ICD-10-CM

## 2021-09-09 DIAGNOSIS — K21.9 GASTRO-ESOPHAGEAL REFLUX DISEASE WITHOUT ESOPHAGITIS: ICD-10-CM

## 2021-09-09 RX ORDER — PANTOPRAZOLE SODIUM 20 MG/1
40 TABLET, DELAYED RELEASE ORAL
Refills: 0 | Status: DISCONTINUED | OUTPATIENT
Start: 2021-09-09 | End: 2021-09-09

## 2021-09-09 RX ADMIN — Medication 20 MILLIGRAM(S): at 09:31

## 2021-09-09 RX ADMIN — Medication 30 MILLILITER(S): at 10:40

## 2021-09-09 RX ADMIN — OXYCODONE HYDROCHLORIDE 10 MILLIGRAM(S): 5 TABLET ORAL at 09:34

## 2021-09-09 RX ADMIN — CETIRIZINE HYDROCHLORIDE 10 MILLIGRAM(S): 10 TABLET ORAL at 09:31

## 2021-09-09 RX ADMIN — OXYCODONE HYDROCHLORIDE 10 MILLIGRAM(S): 5 TABLET ORAL at 08:48

## 2021-09-09 RX ADMIN — TIZANIDINE 6 MILLIGRAM(S): 4 TABLET ORAL at 08:49

## 2021-09-09 RX ADMIN — INFLUENZA VIRUS VACCINE 0.5 MILLILITER(S): 15; 15; 15; 15 SUSPENSION INTRAMUSCULAR at 10:41

## 2021-09-09 RX ADMIN — POLYETHYLENE GLYCOL 3350 17 GRAM(S): 17 POWDER, FOR SOLUTION ORAL at 09:31

## 2021-09-09 RX ADMIN — PANTOPRAZOLE SODIUM 40 MILLIGRAM(S): 20 TABLET, DELAYED RELEASE ORAL at 10:41

## 2021-09-09 RX ADMIN — BUPROPION HYDROCHLORIDE 150 MILLIGRAM(S): 150 TABLET, EXTENDED RELEASE ORAL at 09:31

## 2021-09-09 RX ADMIN — GABAPENTIN 300 MILLIGRAM(S): 400 CAPSULE ORAL at 09:31

## 2021-09-09 NOTE — DISCHARGE NOTE PROVIDER - CARE PROVIDER_API CALL
Becky Tariq)  Orthopaedic Surgery  [address] [place] [address], [address] [place] [address]  Phone: [phone]  Fax: [phone]  Follow Up Time: 1 week

## 2021-09-09 NOTE — PROGRESS NOTE ADULT - SUBJECTIVE AND OBJECTIVE BOX
Pt seen resting on chair. Pt states "I am feeling much better". Pt has mild low back pain and mild pain of the left hamstring, left ant tib tolerable with meds at this time. Pt has occasional numbness in the same distribution. Pt has left foot unchanged. Pt voided on own without complications.    PE  Gen appearance: NAD  motor strength: 5/5 of the right HF, quads, ant tib, gastrocs, and EHL. 4/5 of the left ant tib and EHL  Sensation: intact to light touch   No calf tenderness bilat  Incisional site: clean and dry.    Plan  Afeb  Stable for dicharge home. Medrol pack will be sent from office EMR  Discussed case with Dr. Tariq.  Pt seen resting on chair. Pt states "I am feeling much better". Pt has mild low back pain and mild pain of the left hamstring, left ant tib tolerable with meds at this time. Pt has occasional numbness in the same distribution. Pt has left foot unchanged. Pt voided on own without complications.    PE  Gen appearance: NAD  motor strength: 5/5 of the right HF, quads, ant tib, gastrocs, and EHL. 4/5 of the left ant tib and EHL  Sensation: intact to light touch   No calf tenderness bilat  Incisional site: clean and dry.    Plan  Afeb  Stable for discharge home. Medrol pack will be sent from office EMR  F/u office as scheduled.   Discussed case with Dr. Tariq.  Pt seen resting on chair. Pt states "I am feeling much better". Pt has mild low back pain and mild pain of the left hamstring, left ant tib tolerable with meds at this time. Pt has occasional numbness in the same distribution. Pt has left foot unchanged. Pt voided on own without complications.    PE  Gen appearance: NAD  motor strength: 5/5 of the right HF, quads, ant tib, gastrocs, and EHL. 4/5 of the left ant tib and EHL  Sensation: intact to light touch   No calf tenderness bilat  Incisional site: clean and dry.    Plan  Afeb  Stable for discharge home. Medrol pack will be sent from office EMR  F/u office as scheduled.   Discussed case with Dr. Tariq.       Addendum: Pt s/p Left L5-S1 revision discectomy, MAS-TLIF done on 09/02/21.

## 2021-09-09 NOTE — DISCHARGE NOTE PROVIDER - HOSPITAL COURSE
Pt s/p Left L5-S1 revision discectomy, MAS-TLIF done on 09/02/21 discharged on 09/06/21. On 09/06/21 when he was at home he noticed increase back pain with pain radiating to b/l lower extremities with numbness. He went back to ED on 09/06/21 and was admitted for pain control. X-rays were ordered as well. On 09/07/21 evaluated with no signs of infection at the time. ESR elevated at 51 expected 2/2 post op. Dressing was chnaged. No red flag symptoms noted for epidural hamtoma/cord compression. Motor and neuro exam stable.  IV decadron was ordered. Recommendation was made if pain improves then patient can be discharged with a Medrol pack. On 09/08/21. Pt was examined noting feeling better. Ambulated in room independently. Motor and neuro exam stable. Decadron taper was continued. Pt seen resting on chair. Pt states "I am feeling much better". Pt has mild low back pain and mild pain of the left hamstring, left ant tib tolerable with meds at this time. Pt has occasional numbness in the same distribution. Pt has left foot unchanged. Pt voided on own without complications. motor strength: 5/5 of the right HF, quads, ant tib, gastrocs, and EHL. 4/5 of the left ant tib and EHL. Sensation: intact to light touch . No calf tenderness bilat. Incisional site: clean and dry.  Afeb. Stable for discharge home. Medrol pack will be sent from office EMR  F/u office as scheduled. Discussed case with Dr. Tariq.

## 2021-09-09 NOTE — CHART NOTE - NSCHARTNOTEFT_GEN_A_CORE
Medrol pack was already sent per BAN Nunez.
Called by RN pt c/o persistent numbness RLE and LLE.   S: Denies chills. Feels his legs are numb. Has been able to ambulate today but feels uncomfortable due to numbness'. No bowel or bladder.  pt S&E on 2n  O: RLE 5/5  LLE 4/5  Sensation intact to semi-firm touch LE bilaterally feet and 1st web space  Pulse normal DP  toes warm distally  Calf no TTP bilat  A/P;  Spoke w Dr Brown at 19;15h  recommends IV decadron 6mg now then 4iv in AM  Reeval in AM  if improves DC on Medrol dose pack  if not improved possible imaging  Pt was updated w plan   AS directed by DR Brown

## 2021-09-09 NOTE — DISCHARGE NOTE PROVIDER - NSDCMRMEDTOKEN_GEN_ALL_CORE_FT
gabapentin 300 mg oral capsule: 1 cap(s) orally every 8 hours  Medrol Dosepak 4 mg oral tablet: Day 1: Take 8  tablets  Day 2: Take 4 Tablets  Day 3: Take 2 Tablets  Day 4: Take 1 Tablet  Day 5: Take half of a tablet  PROzac 20 mg oral capsule: 1 cap(s) orally once a day  tiZANidine 4 mg oral tablet: 1 tab(s) orally 3 times a day  Vyvanse 40 mg oral capsule: 1 cap(s) orally once a day (in the morning)-when pt goes to work  Wellbutrin  mg/24 hours oral tablet, extended release: 1 tab(s) orally every 24 hours  ZyrTEC 10 mg oral tablet: 1 tab(s) orally once a day

## 2021-09-09 NOTE — DISCHARGE NOTE PROVIDER - NSDCCPGOAL_GEN_ALL_CORE_FT
To get better and follow your care plan as instructed. Keep incisional site clean and dry. Avoid bending, twisting, and lifting.

## 2021-09-11 DIAGNOSIS — Z98.1 ARTHRODESIS STATUS: ICD-10-CM

## 2021-09-11 DIAGNOSIS — M54.9 DORSALGIA, UNSPECIFIED: ICD-10-CM

## 2021-09-13 ENCOUNTER — TRANSCRIPTION ENCOUNTER (OUTPATIENT)
Age: 31
End: 2021-09-13

## 2021-09-13 RX ORDER — LISDEXAMFETAMINE DIMESYLATE 40 MG/1
40 CAPSULE ORAL
Qty: 90 | Refills: 0 | Status: ACTIVE | COMMUNITY
Start: 2020-12-14 | End: 1900-01-01

## 2022-01-25 ENCOUNTER — APPOINTMENT (OUTPATIENT)
Dept: NEUROLOGY | Facility: CLINIC | Age: 32
End: 2022-01-25

## 2023-03-02 NOTE — ED ADULT TRIAGE NOTE - WEIGHT IN KG
Quality 431: Preventive Care And Screening: Unhealthy Alcohol Use - Screening: Patient not identified as an unhealthy alcohol user when screened for unhealthy alcohol use using a systematic screening method
Quality 226: Preventive Care And Screening: Tobacco Use: Screening And Cessation Intervention: Patient screened for tobacco use and is an ex/non-smoker
Quality 110: Preventive Care And Screening: Influenza Immunization: Influenza Immunization Administered during Influenza season
129.3
Detail Level: Detailed

## 2024-04-14 ENCOUNTER — EMERGENCY (EMERGENCY)
Facility: HOSPITAL | Age: 34
LOS: 0 days | Discharge: HOME | End: 2024-04-14
Attending: EMERGENCY MEDICINE
Payer: COMMERCIAL

## 2024-04-14 VITALS
OXYGEN SATURATION: 99 % | DIASTOLIC BLOOD PRESSURE: 73 MMHG | TEMPERATURE: 98 F | RESPIRATION RATE: 18 BRPM | HEART RATE: 75 BPM | SYSTOLIC BLOOD PRESSURE: 143 MMHG

## 2024-04-14 VITALS
OXYGEN SATURATION: 99 % | HEART RATE: 80 BPM | TEMPERATURE: 98 F | HEIGHT: 70 IN | SYSTOLIC BLOOD PRESSURE: 137 MMHG | WEIGHT: 279.99 LBS | DIASTOLIC BLOOD PRESSURE: 87 MMHG | RESPIRATION RATE: 18 BRPM

## 2024-04-14 DIAGNOSIS — Z98.84 BARIATRIC SURGERY STATUS: Chronic | ICD-10-CM

## 2024-04-14 DIAGNOSIS — Z88.7 ALLERGY STATUS TO SERUM AND VACCINE: ICD-10-CM

## 2024-04-14 DIAGNOSIS — Z98.89 OTHER SPECIFIED POSTPROCEDURAL STATES: Chronic | ICD-10-CM

## 2024-04-14 DIAGNOSIS — R20.2 PARESTHESIA OF SKIN: ICD-10-CM

## 2024-04-14 DIAGNOSIS — M54.12 RADICULOPATHY, CERVICAL REGION: ICD-10-CM

## 2024-04-14 PROCEDURE — 99282 EMERGENCY DEPT VISIT SF MDM: CPT

## 2024-04-14 PROCEDURE — 99284 EMERGENCY DEPT VISIT MOD MDM: CPT

## 2024-04-14 NOTE — ED PROVIDER NOTE - CCCP TRG CHIEF CMPLNT
Pulmonary    77 yo seen in office for eval of abnormal chest ct with lung nodules and adenopathy  Being seen by oncology  Sent to pulmonary for tissue dx    Since his office eval a pet scan has been done with increased uptake in LN in neck and mediastinum, L hilar mass, and a pet positive density in the R peritonsillar region    Concern for metastatic disease - possible lung or head and neck primary    For EBUS / navigational bronchoscopy for tissue dx    Procedure d/w pt who is agreeable to have the procedure performed under general anesthesia    Patient Vitals for the past 4 hrs:   BP Temp Pulse Resp SpO2 Height Weight   10/16/19 1245 138/75 97.8 °F (36.6 °C) 63 24 94 % -- --   10/16/19 1152 -- -- -- -- -- 5' 11.5\" (1.816 m) 72.6 kg (160 lb)   Temp (24hrs), Av.8 °F (36.6 °C), Min:97.8 °F (36.6 °C), Max:97.8 °F (36.6 °C)  No intake or output data in the 24 hours ending 10/16/19 1254     Alert  No distress  CTA  RRR  Warm and dry    Impression   Lung masses and mediastinal lymphadenopathy concern for malilgnancy    Plan  EBUS / Navigational bronchoscopy with bx    Ileana Frias MD [No studies available for review at this time.] : No studies available for review at this time. neck pain

## 2024-04-14 NOTE — ED PROVIDER NOTE - PROGRESS NOTE DETAILS
Patient seen and examined patient complaining of bilateral upper extremity paresthesias his motor function shows 5 out of 5 strength normal distal pulses normal range of motion all digits no sensory deficits on exam.  Patient seen at [facility] last week with MRI cervical spine showing multilevel degenerative disc disease with mild posterior disc herniations which in correlation with the neuroforaminal stenosis found completely can be attributed to patient's acute symptoms here he was given steroids which she is on day 2 of at [facility] here requesting to see his spine surgeon who did his lumbar back  Dr. Tariq he is not on-call he has an appointment with this doctor in 3 weeks.   I advised him this is something that would not require acute surgical intervention and he is safe to follow-up with his spine surgeon pending he does not have any actual motor or sensation loss and that Dr. Tariq is not on-call here he is unsatisfied with this medical assessment and is requesting neurosurgical consultation at this hospital Spoke with nurse neurosurgical PA advises Dr. Tariq is covered by the orthopedic team here will call orthopedic resident for consult Spoke with orthopedic resident on-call advised to call Dr. Tariq's office placing consult to Dr. Tariq's office Consult placed to Dr. Tariq's on-call number awaiting callback updated patient with plan of care he is agreement with this plan of care I spoke with Dr. Bland  on-call for Dr. Tariq he was able to personally review patient's MRI findings he advises me he will have his office call patient for appointment Monday or Tuesday to continue steroid Dosepak I informed patient of this plan of care he is in complete agreement this plan of care return to ED for any new onset motor or sensory deficits

## 2024-04-14 NOTE — ED PROVIDER NOTE - CLINICAL SUMMARY MEDICAL DECISION MAKING FREE TEXT BOX
I spoke with Dr. Bland  on-call for Dr. Tariq he was able to personally review patient's MRI findings he advises me he will have his office call patient for appointment Monday or Tuesday to continue steroid Dosepak I informed patient of this plan of care he is in complete agreement this plan of care return to ED for any new onset motor or sensory deficits

## 2024-04-14 NOTE — ED PROVIDER NOTE - PHYSICAL EXAMINATION
muscle skeletal:  5 out of 5 upper extremity lower extremity strength normal DTRs normal distal pulses normal range of motion upper extremity all digits no sensory deficits

## 2024-04-14 NOTE — ED ADULT NURSE NOTE - CHIEF COMPLAINT QUOTE
Pt presents to ER c/o neck discomfort/numbness/tingling and HA. onset of symptoms began at 0900 this morning. Pt reports having L5S1 fusion in 2021. Pt was seen at [facility] 6 days PTA r/t same symptoms where he received MRI and CT neck/head; findings were [id number] disc protrusion and he was prescribed steroids. Pt reports fusion in 2021 was performed at

## 2024-04-14 NOTE — ED ADULT TRIAGE NOTE - CHIEF COMPLAINT QUOTE
Pt presents to ER c/o neck discomfort/numbness/tingling and HA. onset of symptoms began at 0900 this morning. Pt reports having L5S1 fusion in 2021. Pt was seen at [facility] 6 days PTA r/t same symptoms where he received MRI and CT neck/head; findings were [id number] disc protrusion and he was prescribed steroids. Pt reports fusion in 2021 was performed at

## 2024-04-14 NOTE — ED PROVIDER NOTE - PATIENT PORTAL LINK FT
You can access the FollowMyHealth Patient Portal offered by [facility] by registering at the following website: [email or web address]. By joining [employer]’s FollowMyHealth portal, you will also be able to view your health information using other applications (apps) compatible with our system.

## 2024-04-14 NOTE — ED ADULT NURSE NOTE - OBJECTIVE STATEMENT
pt in ed c/o of numbness bilat hands. pt reports symptoms started this morning. hx of degenerative disc disease. awaiting call from MD Tariq.

## 2024-04-14 NOTE — ED PROVIDER NOTE - NSFOLLOWUPINSTRUCTIONS_ED_ALL_ED_FT
Patient Name: CRISTIANO VALENZUELA  Caregiver: Jhonatan Randle  Cervical Radiculopathy       Cervical radiculopathy happens when a nerve in the neck (a cervical nerve) is pinched or bruised. This condition can happen because of an injury to the cervical spine (vertebrae) in the neck, or as part of the normal aging process. Pressure on the cervical nerves can cause pain or numbness that travels from the neck all the way down into the arm and fingers. Usually, this condition gets better with rest. Treatment may be needed if the condition does not improve.    What are the causes?  This condition may be caused by:    A neck injury.  A bulging (herniated) disk.  Muscle spasms.  Muscle tightness in the neck because of overuse.  Arthritis.  Breakdown or degeneration in the bones and joints of the spine (spondylosis) due to aging.  Bone spurs that may develop near the cervical nerves.    What are the signs or symptoms?  Symptoms of this condition include:    Pain. The pain may travel from the neck to the arm and hand. The pain can be severe or irritating. It may be worse when you move your neck.  Numbness or tingling in your arm or hand.  Weakness in the affected arm and hand, in severe cases.    How is this diagnosed?  This condition may be diagnosed based on your symptoms, your medical history, and a physical exam. You may also have tests, including:    X-rays.  A CT scan.  An MRI.  An electromyogram (EMG).  Nerve conduction tests.    How is this treated?  In many cases, treatment is not needed for this condition. With rest, the condition usually gets better over time. If treatment is needed, options may include:    Wearing a soft neck collar (cervical collar) for short periods of time, as told by your health care provider.  Doing physical therapy to strengthen your neck muscles.  Taking medicines, such as NSAIDs or oral corticosteroids.  Having spinal injections, in severe cases.  Having surgery. This may be needed if other treatments do not help. Different types of surgery may be done depending on the cause of this condition.    Follow these instructions at home:      If you have a cervical collar:    Wear it as told by your health care provider. Remove it only as told by your health care provider.  Ask your health care provider if you can remove the collar for cleaning and bathing. If you are allowed to remove the collar for cleaning or bathing:    Follow instructions from your health care provider about how to remove the collar safely.  Clean the collar by wiping it with mild soap and water and drying it completely.  Take out any removable pads in the collar every 1–2 days, and wash them by hand with soap and water. Let them air-dry completely before you put them back in the collar.  Check your skin under the collar for irritation or sores. If you see any, tell your health care provider.        Managing pain      Take over-the-counter and prescription medicines only as told by your health care provider.  If directed, put ice on the affected area.    If you have a soft neck collar, remove it as told by your health care provider.  Put ice in a plastic bag.  Place a towel between your skin and the bag.  Leave the ice on for 20 minutes, 2–3 times a day.  If applying ice does not help, you can try using heat. Use the heat source that your health care provider recommends, such as a moist heat pack or a heating pad.    Place a towel between your skin and the heat source.  Leave the heat on for 20–30 minutes.  Remove the heat if your skin turns bright red. This is especially important if you are unable to feel pain, heat, or cold. You may have a greater risk of getting burned.  Try a gentle neck and shoulder massage to help relieve symptoms.        Activity    Rest as needed.  Return to your normal activities as told by your health care provider. Ask your health care provider what activities are safe for you.  Do stretching and strengthening exercises as told by your health care provider or physical therapist.  Do not lift anything that is heavier than 10 lb (4.5 kg) until your health care provider tells you that it is safe.        General instructions    Use a flat pillow when you sleep.  Do not drive while wearing a cervical collar. If you do not have a cervical collar, ask your health care provider if it is safe to drive while your neck heals.  Ask your health care provider if the medicine prescribed to you requires you to avoid driving or using heavy machinery.  Do not use any products that contain nicotine or tobacco, such as cigarettes, e-cigarettes, and chewing tobacco. These can delay healing. If you need help quitting, ask your health care provider.  Keep all follow-up visits as told by your health care provider. This is important.    Contact a health care provider if:  Your condition does not improve with treatment.    Get help right away if:  Your pain gets much worse and cannot be controlled with medicines.  You have weakness or numbness in your hand, arm, face, or leg.  You have a high fever.  You have a stiff, rigid neck.  You lose control of your bowels or your bladder (have incontinence).  You have trouble with walking, balance, or speaking.    Summary  Cervical radiculopathy happens when a nerve in the neck is pinched or bruised.  A nerve can get pinched from a bulging disk, arthritis, muscle spasms, or an injury to the neck.  Symptoms include pain, tingling, or numbness radiating from the neck into the arm or hand. Weakness can also occur in severe cases.  Treatment may include rest, wearing a cervical collar, and physical therapy. Medicines may be prescribed to help with pain. In severe cases, injections or surgery may be needed.    ADDITIONAL NOTES AND INSTRUCTIONS    Please follow up with your Primary MD in 24-48 hr.  Seek immediate medical care for any new/worsening signs or symptoms.     Document Released: 9/12/2002 Document Revised: 11/8/2019 Document Reviewed: 11/8/2019  [employer] Patient Education ©2019 [employer] This information is not intended to replace advice given to you by your health care provider. Make sure you discuss any questions you have with your health care provider.

## 2024-04-14 NOTE — ED PROVIDER NOTE - OBJECTIVE STATEMENT
Patient presents to ED complaining of bilateral upper extremity upper extremity paresthesias no trauma of note patient has chronic migraines was recently worked up for similar symptoms at [facility] had MRI cervical spine showing multiple disc herniations patient has no actual motor deficits no actual sensory deficits on objective exam.  No headache no blurry vision no chest pain or shortness of breath no abdominal pain no lumbar pain no urinary incontinence or retention he is on day 2 of a Medrol Dosepak that was given to him by [facility]

## 2024-04-14 NOTE — ED ADULT NURSE NOTE - NSICDXFAMILYHX_GEN_ALL_CORE_FT
General Pediatrics
FAMILY HISTORY:  Family history of hyperthyroidism    Father  Still living? Yes, Estimated age: Age Unknown  Family history of coronary artery disease, Age at diagnosis: Age Unknown  Family history of diabetes mellitus, Age at diagnosis: Age Unknown    Mother  Still living? Yes, Estimated age: Age Unknown  Family history of hypertension, Age at diagnosis: Age Unknown

## 2024-11-25 NOTE — ED PROVIDER NOTE - NEUROLOGICAL, MLM
Pt mom called, Pt came home for Thanksgiving and has lost about 15pounds since he left for college. Pt said it is because he does not really like the food there and because he walks a lot on campus, But mom is concerned and would like to get labs done on him this weekend.    Alert and oriented, no focal deficits, no motor or sensory deficits.